# Patient Record
Sex: FEMALE | Race: WHITE | Employment: UNEMPLOYED | URBAN - METROPOLITAN AREA
[De-identification: names, ages, dates, MRNs, and addresses within clinical notes are randomized per-mention and may not be internally consistent; named-entity substitution may affect disease eponyms.]

---

## 2017-06-15 PROBLEM — Z78.0 MENOPAUSE: Status: ACTIVE | Noted: 2017-06-15

## 2018-08-20 PROBLEM — E66.01 SEVERE OBESITY (BMI 35.0-39.9): Status: ACTIVE | Noted: 2018-08-20

## 2018-09-11 ENCOUNTER — HOSPITAL ENCOUNTER (OUTPATIENT)
Dept: MAMMOGRAPHY | Age: 53
Discharge: HOME | End: 2018-09-11
Attending: OBSTETRICS & GYNECOLOGY
Payer: COMMERCIAL

## 2018-09-11 DIAGNOSIS — Z12.31 VISIT FOR SCREENING MAMMOGRAM: ICD-10-CM

## 2018-09-11 PROCEDURE — 77063 BREAST TOMOSYNTHESIS BI: CPT

## 2018-09-20 ENCOUNTER — HOSPITAL ENCOUNTER (OUTPATIENT)
Dept: MAMMOGRAPHY | Age: 53
Discharge: HOME | End: 2018-09-20
Attending: OBSTETRICS & GYNECOLOGY
Payer: COMMERCIAL

## 2018-09-20 DIAGNOSIS — R92.8 ABNORMAL SCREENING MAMMOGRAM: ICD-10-CM

## 2018-09-20 PROCEDURE — 77065 DX MAMMO INCL CAD UNI: CPT

## 2019-11-04 ENCOUNTER — HOSPITAL ENCOUNTER (OUTPATIENT)
Dept: MAMMOGRAPHY | Age: 54
Discharge: HOME | End: 2019-11-04
Attending: OBSTETRICS & GYNECOLOGY

## 2019-11-04 DIAGNOSIS — Z12.31 SCREENING MAMMOGRAM FOR HIGH-RISK PATIENT: ICD-10-CM

## 2021-08-09 ENCOUNTER — HOSPITAL ENCOUNTER (OUTPATIENT)
Dept: MAMMOGRAPHY | Age: 56
Discharge: HOME | End: 2021-08-09
Attending: OBSTETRICS & GYNECOLOGY
Payer: COMMERCIAL

## 2021-08-09 DIAGNOSIS — Z12.31 VISIT FOR SCREENING MAMMOGRAM: ICD-10-CM

## 2021-08-09 PROCEDURE — 77067 SCR MAMMO BI INCL CAD: CPT

## 2022-03-19 PROBLEM — Z78.0 MENOPAUSE: Status: ACTIVE | Noted: 2017-06-15

## 2023-02-09 ENCOUNTER — TRANSCRIBE ORDERS (OUTPATIENT)
Dept: SCHEDULING | Age: 58
End: 2023-02-09

## 2023-02-09 DIAGNOSIS — Z12.31 SCREENING MAMMOGRAM FOR HIGH-RISK PATIENT: Primary | ICD-10-CM

## 2023-02-21 ENCOUNTER — HOSPITAL ENCOUNTER (OUTPATIENT)
Dept: MAMMOGRAPHY | Age: 58
Discharge: HOME | End: 2023-02-24
Payer: COMMERCIAL

## 2023-02-21 DIAGNOSIS — Z12.31 SCREENING MAMMOGRAM FOR HIGH-RISK PATIENT: ICD-10-CM

## 2023-02-21 PROCEDURE — 77067 SCR MAMMO BI INCL CAD: CPT

## 2023-04-03 ENCOUNTER — OFFICE VISIT (OUTPATIENT)
Dept: ORTHOPEDIC SURGERY | Age: 58
End: 2023-04-03

## 2023-04-03 VITALS — BODY MASS INDEX: 39.56 KG/M2 | HEIGHT: 62 IN | WEIGHT: 215 LBS

## 2023-04-03 DIAGNOSIS — M79.672 BILATERAL FOOT PAIN: Primary | ICD-10-CM

## 2023-04-03 DIAGNOSIS — M19.171 POST-TRAUMATIC OSTEOARTHRITIS OF RIGHT ANKLE: ICD-10-CM

## 2023-04-03 DIAGNOSIS — M19.171 POST-TRAUMATIC OSTEOARTHRITIS OF RIGHT FOOT: ICD-10-CM

## 2023-04-03 DIAGNOSIS — M19.172 POST-TRAUMATIC OSTEOARTHRITIS OF LEFT FOOT: ICD-10-CM

## 2023-04-03 DIAGNOSIS — M20.5X9 ACQUIRED CLAW TOE, UNSPECIFIED LATERALITY: ICD-10-CM

## 2023-04-03 DIAGNOSIS — G57.62 MORTON'S NEUROMA OF LEFT FOOT: ICD-10-CM

## 2023-04-03 DIAGNOSIS — M79.671 BILATERAL FOOT PAIN: Primary | ICD-10-CM

## 2023-04-03 RX ORDER — METHYLPREDNISOLONE ACETATE 40 MG/ML
40 INJECTION, SUSPENSION INTRA-ARTICULAR; INTRALESIONAL; INTRAMUSCULAR; SOFT TISSUE ONCE
Status: COMPLETED | OUTPATIENT
Start: 2023-04-03 | End: 2023-04-03

## 2023-04-03 RX ADMIN — METHYLPREDNISOLONE ACETATE 40 MG: 40 INJECTION, SUSPENSION INTRA-ARTICULAR; INTRALESIONAL; INTRAMUSCULAR; SOFT TISSUE at 12:15

## 2023-04-03 NOTE — PROGRESS NOTES
claw toe  Left posttraumatic 1-5 tarsometatarsal arthritis, 2-3 claw toes, 3-4 neuroma    Plan:   The patient and I discussed the above assessment. We explored treatment options. Despite her posttraumatic ankle valgus arthritis and bilateral tarsometatarsal arthritis, her issues deal with her forefeet  Left 2-3 claw toes are reducible with no MTP pain; her pain appears to be 3-4 neuroma related  Right second claw toe with mild MTP pain and not reducible  She would like surgery on her right claw toe and possibly left 2-3 claw toes, 3-4 neuroma  Her left forefoot was injected today to see if that resolves her pain, if so, isolated neurectomy without claw toe resection is an option  We discussed right 2nd claw toe as an isolated procedure or combining with left foot surgery    Advanced medical imaging: No indication for CT scan  We discussed care and shoe protection  Orthotic/prosthetic: Instead of custom insoles, recommend OOFOS       Medication - OTC meds prn  Surgical discussion: She understands that due to my thumb conditions, the need to see a surgical partner for any surgery requiring the operating room. I outlined surgery with certain risks/complications and expected post-op course. My opined surgical recommendation and surgical discussion may not align exactly with my surgical partner's opinion; therefore, my partner's recommendation is what should be accepted and followed. Follow up: Surgical partner  Work status: Sitting    This note was created using Dragon voice recognition software which may result in errors of speech and spelling recognition and word/phrase syntax errors.

## 2023-04-03 NOTE — LETTER
You can purchase the following items online or from local stores: [employer] - shoes or sandals   [email or web address]

## 2023-04-05 ENCOUNTER — OFFICE VISIT (OUTPATIENT)
Dept: ORTHOPEDIC SURGERY | Age: 58
End: 2023-04-05

## 2023-04-05 DIAGNOSIS — G56.01 RIGHT CARPAL TUNNEL SYNDROME: ICD-10-CM

## 2023-04-05 DIAGNOSIS — M79.642 BILATERAL HAND PAIN: Primary | ICD-10-CM

## 2023-04-05 DIAGNOSIS — M18.0 ARTHRITIS OF CARPOMETACARPAL (CMC) JOINT OF BOTH THUMBS: ICD-10-CM

## 2023-04-05 DIAGNOSIS — M79.641 BILATERAL HAND PAIN: Primary | ICD-10-CM

## 2023-04-05 RX ORDER — MELOXICAM 15 MG/1
15 TABLET ORAL DAILY
Qty: 28 TABLET | Refills: 0 | Status: SHIPPED | OUTPATIENT
Start: 2023-04-05 | End: 2023-05-03

## 2023-04-06 NOTE — PROGRESS NOTES
Patient was fit for a CMC splint for patients bilateral hand/joint. I demonstrated that the thumb slides into the opening and Velcro on the dorsal side of the hand. The strap continues around the thumb and Velcro's again on the ventral side of hand or palm, and then continues through the thumb and first finger to Velcro again on the dorsal side of hand. Patient was fitted and instructed on an  Wrist Splint for patients right wrist. Patient is aware the hand slides in the brace with the thumb placed threw the thumb hole. I demonstrated the correct way to tighten the brace straps to allow for a comfortable fit. Patient understood the correct way to wear the brace. Patient read and signed documenting they understand and agree to [facility] current DME return policy.
Plan:  We discussed the diagnosis and different treatment options. We discussed observation, day/night splinting, cortisone injection(s) and surgical reconstruction with trapeziectomy and suspension arthroplasty and the risks and benefits of all were clearly outlined. We discussed the fact that the vast majority of thumb CMC arthritis causes persistent chronic pain and that surgical reconstruction is the endpoint for patients whose symptoms are not properly alleviated with conservative measures but the vast majority of patients end up having surgery. After discussing all options in detail the patient elects to proceed with a right carpal tunnel brace. She will wear this at night. We will give her bilateral CMC braces. We will give her Mobic and Voltaren. We did discuss injections versus surgical intervention. She like to hold off at this time. .     Patient voiced accordance and understanding of the information provided and the formulated plan. All questions were answered to the patient's satisfaction during the encounter.     4 This is a chronic illness with exacerbation, progression, or side effect of treatment  Treatment at this time: Prescription medication and Brace    FRANCES Mckay - CNP  Orthopaedic Surgery  04/05/23  3:02 PM

## 2023-04-14 ENCOUNTER — TELEPHONE (OUTPATIENT)
Dept: ORTHOPEDIC SURGERY | Age: 58
End: 2023-04-14

## 2023-04-19 PROBLEM — D36.10 NEUROMA: Status: ACTIVE | Noted: 2023-04-19

## 2023-04-30 DIAGNOSIS — M18.0 ARTHRITIS OF CARPOMETACARPAL (CMC) JOINT OF BOTH THUMBS: ICD-10-CM

## 2023-04-30 DIAGNOSIS — G56.01 RIGHT CARPAL TUNNEL SYNDROME: ICD-10-CM

## 2023-05-01 RX ORDER — MELOXICAM 15 MG/1
TABLET ORAL
Qty: 28 TABLET | Refills: 0 | OUTPATIENT
Start: 2023-05-01

## 2023-05-02 ENCOUNTER — OFFICE VISIT (OUTPATIENT)
Dept: ORTHOPEDIC SURGERY | Age: 58
End: 2023-05-02
Payer: COMMERCIAL

## 2023-05-02 VITALS — BODY MASS INDEX: 39.56 KG/M2 | HEIGHT: 62 IN | WEIGHT: 215 LBS

## 2023-05-02 DIAGNOSIS — M20.5X2 ACQUIRED CLAW TOE OF LEFT FOOT: Primary | ICD-10-CM

## 2023-05-02 PROCEDURE — 99213 OFFICE O/P EST LOW 20 MIN: CPT | Performed by: ORTHOPAEDIC SURGERY

## 2023-05-02 NOTE — PROGRESS NOTES
Name: Anne Avalos  YOB: 1965  Gender: female  MRN: [id number]    04/03/2023: Last visit with me  05/02/2023: Here to discuss surgical options    Physical Examination:  Patient appears to be alert and oriented with acceptable appearance.   No obvious distress or SOB  CV: appears to have acceptable vascular color and capillary refill  Neuro: appears to have mostly intact light touch sensation   Skin: Healed incision; thickening right ankle: Bilateral mid feet  MS: Standing: Right 2 claw toe: Left 2-3 claw toes: Gait full  Right = no ankle pain; no midfoot pain; second claw toe  Left = no ankle pain; no midfoot pain; 2-3 claw toes but no MTP pain: 3-4 IDN click but no pain     XR: Left: Right: Standing AP lateral mortise ankle plus AP oblique foot taken 04/03/2023 with:  Left = second metatarsal head arthritis; claw toe; posttraumatic 1-5 tarsometatarsal, naviculocuneiform arthritis; large os trigonum; medial talar dome lucency consistent with OCD  Right = naviculocuneiform, tarsometatarsal arthritis; claw toe; retained hardware medial malleolus and lateral malleolus; ankle valgus posttraumatic arthritis  XR Impression:  As above       Reviewed Test/Records/Documents:  10/21/2009: Right ankle ORIF  2013: Left ankle and tarsometatarsal injury  09/14/2020: Diagnoses: Left 1-5 tarsometatarsal, naviculocuneiform arthritis neuralgia: Right 2-3 tarsometatarsal arthritis, neuralgia, posttraumatic ankle valgus   04/11/2023: Dr. Patricia Gutiérrez office note reviewed outlining right second hammertoe, left midfoot arthritis, left 3-4 neuroma: Discussed A1c elevation and 3-4 neurectomy     04/03/2023: Left 3-4 neuroma injected with 1 cc Xylocaine, 40 mg Depo-Medrol      Assessment:    Right posttraumatic ankle valgus arthritis with retained hardware  Right naviculocuneiform, tarsometatarsal arthritis, 2nd claw toe  Left posttraumatic 1-5 tarsometatarsal arthritis, 2-3 claw toes, 3-4 neuroma    Discussion:  Because

## 2023-05-15 ENCOUNTER — TELEPHONE (OUTPATIENT)
Dept: ORTHOPEDIC SURGERY | Age: 58
End: 2023-05-15

## 2023-05-15 RX ORDER — MELOXICAM 15 MG/1
15 TABLET ORAL DAILY
Qty: 30 TABLET | Refills: 1 | Status: SHIPPED | OUTPATIENT
Start: 2023-05-15 | End: 2023-06-14

## 2023-05-15 NOTE — TELEPHONE ENCOUNTER
She states the Mobic is working very well for her and she is wondering if she can get a refill. Please send to the pharmacy on file.

## 2023-05-23 ENCOUNTER — OFFICE VISIT (OUTPATIENT)
Dept: ORTHOPEDIC SURGERY | Age: 58
End: 2023-05-23
Payer: COMMERCIAL

## 2023-05-23 DIAGNOSIS — M19.079 ARTHRITIS OF MIDFOOT: Primary | ICD-10-CM

## 2023-05-23 PROCEDURE — 99213 OFFICE O/P EST LOW 20 MIN: CPT | Performed by: ORTHOPAEDIC SURGERY

## 2023-05-23 NOTE — PROGRESS NOTES
Name: Joselin Avalos  YOB: 1965  Gender: female  MRN: [id number]    Summary: Right second hammertoe, left midfoot arthritis, left 3/4 Kinsey's neuroma. She states that she wants to consider surgery for midfoot fusion, third and fourth hammertoe corrections along with Kinsey's neuroma excision. She will come back at a later date to discuss her surgery if she desires. CC: left forefoot pain-right hammertoe    HPI: Carlos Whitaker is a 60 y.o. female who has diabetic mild neuropathy with a significant history of bilateral foot surgeries. She does have a right second hammertoe that bothers her but her main issue is the left foot. She states she sustained a fracture of this foot approximate 20 years ago and has had multiple surgeries on this foot to fix hammertoes and released tight tendons. The most recent issue she has had pain over forefoot with numbness into the third and fourth toes. She states that her third and fourth toes feel very numb and tingly and that when she wears tight shoes or walks it causes shooting pains into the toes. She states she has had this before into her second and third toes but that was relieved with a neuroma surgery. She has been seeing a nonoperative orthopedic surgeon for a while regarding all of her foot problems. Most recently she was seen by Dr. Shira Rajput and April third 2023. He had multiple diagnoses for her including midfoot arthritis along with claw toes and neuroma. He provided an injection of Xylocaine into the left 3/4 neuroma. The patient states the rest the day she did very well but as the nighttime came on the pain resumed. She states she had temporary relief from this Xylocaine injection. She was also given neuroma pads but these do not help. She was then referred to me for surgical management. She states she has a hard time wearing shoes, close toed shoes bother her more.   She develops numbness tingling and

## 2023-05-26 ENCOUNTER — TELEPHONE (OUTPATIENT)
Dept: ORTHOPEDIC SURGERY | Age: 58
End: 2023-05-26

## 2023-07-06 ENCOUNTER — OFFICE VISIT (OUTPATIENT)
Dept: ORTHOPEDIC SURGERY | Age: 58
End: 2023-07-06

## 2023-07-06 DIAGNOSIS — M25.531 RIGHT WRIST PAIN: Primary | ICD-10-CM

## 2023-07-06 RX ORDER — MELOXICAM 15 MG/1
15 TABLET ORAL DAILY
Qty: 28 TABLET | Refills: 0 | Status: SHIPPED | OUTPATIENT
Start: 2023-07-06 | End: 2023-08-03

## 2023-07-06 RX ORDER — METHYLPREDNISOLONE 4 MG/1
TABLET ORAL
Qty: 1 KIT | Refills: 0 | Status: SHIPPED | OUTPATIENT
Start: 2023-07-06

## 2023-07-06 NOTE — PROGRESS NOTES
Patient was fitted and instructed on an  Wrist Splint for patients right wrist. Patient is aware the hand slides in the brace with the thumb placed threw the thumb hole. I demonstrated the correct way to tighten the brace straps to allow for a comfortable fit. Patient understood the correct way to wear the brace. Patient read and signed documenting they understand and agree to [facility]'s current DME return policy.

## 2023-07-06 NOTE — PROGRESS NOTES
Orthopaedic Hand Clinic Note    Name: Mitch Avalos  YOB: 1965  Gender: female  MRN: [id number]      CC: Patient seen today for right wrist pain    HPI: Xiomara Wagner is a 60 y.o. female right hand dominant with a chief complaint of right wrist pain. She reports Friday through Monday of last week she was wearing 2 bracelets on her right wrist.  She denies any injury. She reports Monday evening her wrist was very sore. She said by Tuesday morning it was very swollen and tender to touch. She reports that it his slightly improved. She did do some yard work this morning without difficulty. She has tried Voltaren gel. She has also tried her [address] [place] [address] Dr brace. ROS/Meds/PSH/PMH/FH/SH: I personally reviewed the patients standard intake form. Pertinents are discussed in the HPI    Physical Examination:  General: Awake and alert. HEENT: Normocephalic, atraumatic  CV/Pulm: Breathing even and unlabored  Skin: No obvious rashes noted. Lymphatic: No obvious evidence of lymphedema or lymphadenopathy    Musculoskeletal Exam:  Examination on the right upper extremity demonstrates cap refill < 5 seconds in all fingers  Patient has swelling to the right wrist.  There is no open areas. Patient is tender to palpation on the ulnar side of the wrist.  She is nontender of the distal radius. She is nontender over the radiocarpal joint. She denies paresthesia. She has good capillary refill. Imaging / Electrodiagnostic Tests:     None    Assessment:   1. Right wrist pain        Plan:   We discussed the diagnosis and different treatment options. We discussed observation, therapy, antiinflammatory medications and other pertinent treatment modalities. After discussing in detail the patient elects to proceed with Medrol Dosepak, Mobic, wrist brace. I want her to ice the wrist 3 times daily. I will reassess her progress back in 1 week.   I told her she can cancel that appointment if she is doing

## 2023-07-25 ENCOUNTER — OFFICE VISIT (OUTPATIENT)
Dept: ORTHOPEDIC SURGERY | Age: 58
End: 2023-07-25
Payer: COMMERCIAL

## 2023-07-25 ENCOUNTER — TELEPHONE (OUTPATIENT)
Dept: ORTHOPEDIC SURGERY | Age: 58
End: 2023-07-25

## 2023-07-25 DIAGNOSIS — M19.079 ARTHRITIS OF MIDFOOT: Primary | ICD-10-CM

## 2023-07-25 PROCEDURE — 99213 OFFICE O/P EST LOW 20 MIN: CPT | Performed by: ORTHOPAEDIC SURGERY

## 2023-07-25 NOTE — PROGRESS NOTES
wounds. No pain with calf squeeze. TTP @third and fourth webspace with swelling noted. Positive Angus's click that reproduces shooting pains into the third and fourth toes. The third and fourth toes have some rigid deformities at the IP joints or hammertoes. Multiple prior incisions are noted throughout the fifth toe, dorsum of the midfoot and great toe but none of these are sensitive. No signs of infection. Palpable dorsal osteophytes noted in the top of the midfoot with tenderness palpation over the tarsometatarsal joints. No signs of instability. normal silverskoid exam: With the hindfoot in neutral and forefoot supinated there is good ankle dorsiflexion with the knee flexed and extended. Cavovarus foot posture when standing. Talar tilt exam : normal  Anterior drawer exam w/ ankle plantarflexed at 20 deg: normal    Neuro:  decreased SILT in entire foot. [place] Monofilament 5.07 exam normal however    Vascular: Bilateral DP and PT: dorsalis pedis 4/4    Imaging:     BILATERAL and X-Ray 3 vw Foot (AP/Lateral/Oblique) for foot pain   Findings: Regarding the left foot there is a second hammertoe noted with a widening of the 3/4 webspace. Significant midfoot arthritis is noted the first second third fourth TMT joints with suspected proximal chronic Lisfranc injury. No signs of acute forefoot fractures. No signs of significant forefoot injuries. Regarding the right side there is an ankle hardware that is in place but no signs of significant forefoot anomalies except for a second hammertoe. Mild midfoot arthritis noted   Impression: Bilateral midfoot arthritis   Signature: Cristina Olivo MD        Assessment:   Left 3/4 neuroma  Left third and fourth hammertoe  Left midfoot arthritis    Plan:   4 This is a chronic illness/condition with exacerbation and progression  Treatment at this time:     At this point we had a long discussion about operative and nonoperative management.   I specifically

## 2023-07-30 DIAGNOSIS — M25.531 RIGHT WRIST PAIN: ICD-10-CM

## 2023-07-31 ENCOUNTER — TELEPHONE (OUTPATIENT)
Dept: ORTHOPEDIC SURGERY | Age: 58
End: 2023-07-31

## 2023-07-31 RX ORDER — MELOXICAM 15 MG/1
TABLET ORAL
Qty: 28 TABLET | Refills: 0 | OUTPATIENT
Start: 2023-07-31

## 2023-07-31 NOTE — TELEPHONE ENCOUNTER
LEFT A VM FOR PAT TO CALL BACK Patient complaining of left flank pain since yesterday  She states she had kidney stones in the past  Will check renal u/s

## 2023-07-31 NOTE — TELEPHONE ENCOUNTER
----- Message from Sirisha Lemos MA sent at 7/25/2023  2:57 PM EDT -----  Pt would like for [facility] to call her to change sx date please f/u

## 2023-08-04 ENCOUNTER — TELEPHONE (OUTPATIENT)
Dept: ORTHOPEDIC SURGERY | Age: 58
End: 2023-08-04

## 2023-09-07 NOTE — PROGRESS NOTES
HPI  Aries Garcia is a 60 y.o. female seen for annual GYN exam.  She c/o vaginal dryness. Past Medical History, Past Surgical History, Family history, Social History, and Medications were all reviewed with the patient today and updated as necessary.      Current Outpatient Medications   Medication Sig    estradiol (ESTRACE VAGINAL) 0.1 MG/GM vaginal cream Place 1 g vaginally Twice a Week    Estradiol (VAGIFEM) 10 MCG TABS vaginal tablet Place 1 tablet vaginally Twice a Week    estradiol (VIVELLE) 0.1 MG/24HR Place 1 patch onto the skin Twice a Week APPLY 1 PATCH ON THE SKIN 2 TIMES A WEEK    diclofenac sodium (VOLTAREN) 1 % GEL Apply 2 g topically 3 times daily for 28 days    amitriptyline (ELAVIL) 10 MG tablet Take 2 tablets by mouth    aspirin 81 MG EC tablet Take 1 tablet by mouth    metFORMIN (GLUCOPHAGE) 500 MG tablet Take 1 tablet by mouth 4 times daily    metoprolol succinate (TOPROL XL) 100 MG extended release tablet Take 1 tablet by mouth    SUMAtriptan (IMITREX) 50 MG tablet Take 1 tablet by mouth    OZEMPIC, 1 MG/DOSE, 4 MG/3ML SOPN INJECT 1 MG UNDER THE SKIN EVERY 7 DAYS    zolpidem (AMBIEN) 5 MG tablet     metoprolol succinate (TOPROL XL) 50 MG extended release tablet Take 1 tablet by mouth daily    meloxicam (MOBIC) 15 MG tablet Take 1 tablet by mouth daily for 28 days    methylPREDNISolone (MEDROL DOSEPACK) 4 MG tablet Follow package instructions (Patient not taking: Reported on 9/11/2023)    meloxicam (MOBIC) 15 MG tablet Take 1 tablet by mouth daily    meloxicam (MOBIC) 15 MG tablet Take 1 tablet by mouth daily for 28 days    dicyclomine (BENTYL) 10 MG capsule Take 10 mg by mouth as needed (Patient not taking: Reported on 9/11/2023)    Dulaglutide (TRULICITY) 5.00 BG/9.1MM SOPN 1 INJECTION WEEKLY (Patient not taking: Reported on 9/11/2023)    olmesartan-hydroCHLOROthiazide (BENICAR HCT) 40-25 MG per tablet TAKE 1 TABLET BY MOUTH EVERY DAY (Patient not taking: Reported on 9/11/2023)

## 2023-09-08 ENCOUNTER — OFFICE VISIT (OUTPATIENT)
Dept: ORTHOPEDIC SURGERY | Age: 58
End: 2023-09-08

## 2023-09-08 DIAGNOSIS — M18.12 ARTHRITIS OF CARPOMETACARPAL (CMC) JOINT OF LEFT THUMB: Primary | ICD-10-CM

## 2023-09-08 RX ORDER — BETAMETHASONE SODIUM PHOSPHATE AND BETAMETHASONE ACETATE 3; 3 MG/ML; MG/ML
6 INJECTION, SUSPENSION INTRA-ARTICULAR; INTRALESIONAL; INTRAMUSCULAR; SOFT TISSUE ONCE
Status: COMPLETED | OUTPATIENT
Start: 2023-09-08 | End: 2023-09-08

## 2023-09-08 RX ADMIN — BETAMETHASONE SODIUM PHOSPHATE AND BETAMETHASONE ACETATE 6 MG: 3; 3 INJECTION, SUSPENSION INTRA-ARTICULAR; INTRALESIONAL; INTRAMUSCULAR; SOFT TISSUE at 12:29

## 2023-09-08 NOTE — PROGRESS NOTES
Patient was fit for a CMC splint for patients left hand/joint. I demonstrated that the thumb slides into the opening and Velcro on the dorsal side of the hand. The strap continues around the thumb and Velcro's again on the ventral side of hand or palm, and then continues through the thumb and first finger to Velcro again on the dorsal side of hand. Patient read and signed documenting they understand and agree to [facility]'s current DME return policy.
will give her a new Comfort Cool brace. I will see her back as needed. Procedure Note    The risk, benefits and alternatives of injection and no injection therapy were discussed. Risks including skin blanching, subcutaneous fat atrophy, and elevations in blood glucose levels were discussed. The patient consented for an injection. Time out performed. The patient has been identified by name and birthdate. The injection site was identified, marked and prepped with a alcohol swab. The left thumb CMC joint was injected with 0.5ml of 6mg/ml Celestone and 0.5ml of Lidocaine plain 1%. The injection site was then dressed with a bandaid. The patient tolerated the injection well. The patient was instructed to monitor their blood sugars if diabetic and call if any concerns. The patient was instructed to call the office if any adverse local effects occurred or any if any questions or concerns arise. Patient voiced accordance and understanding of the information provided and the formulated plan. All questions were answered to the patient's satisfaction during the encounter. 4 This is a chronic illness with exacerbation, progression, or side effect of treatment  Treatment at this time:Brace and Minor procedure: cortisone injection. I discussed the risk of skin blanching and hyperglycemia. I discussed the symptoms of hyperglycemia such as confusion, lethargy, polyuria and polydipsia. If any of these symptoms occur the patient is to present to an urgent care facility or primary care doctor for blood sugar evaluation.     FRANCES Parker - CNP  Orthopaedic Surgery  09/08/23  12:33 PM

## 2023-09-11 ENCOUNTER — OFFICE VISIT (OUTPATIENT)
Dept: OBGYN CLINIC | Age: 58
End: 2023-09-11
Payer: COMMERCIAL

## 2023-09-11 VITALS
WEIGHT: 198 LBS | DIASTOLIC BLOOD PRESSURE: 70 MMHG | BODY MASS INDEX: 36.44 KG/M2 | HEIGHT: 62 IN | SYSTOLIC BLOOD PRESSURE: 130 MMHG

## 2023-09-11 DIAGNOSIS — N95.1 MENOPAUSE SYNDROME: ICD-10-CM

## 2023-09-11 DIAGNOSIS — N95.2 VAGINAL ATROPHY: ICD-10-CM

## 2023-09-11 DIAGNOSIS — Z01.419 ENCOUNTER FOR WELL WOMAN EXAM WITH ROUTINE GYNECOLOGICAL EXAM: Primary | ICD-10-CM

## 2023-09-11 PROCEDURE — 99386 PREV VISIT NEW AGE 40-64: CPT | Performed by: OBSTETRICS & GYNECOLOGY

## 2023-09-11 RX ORDER — METOPROLOL SUCCINATE 50 MG/1
50 TABLET, EXTENDED RELEASE ORAL DAILY
COMMUNITY
Start: 2023-08-23

## 2023-09-11 RX ORDER — ESTRADIOL 10 UG/1
10 INSERT VAGINAL
Qty: 24 TABLET | Refills: 4 | Status: SHIPPED | OUTPATIENT
Start: 2023-09-11

## 2023-09-11 RX ORDER — SEMAGLUTIDE 1.34 MG/ML
INJECTION, SOLUTION SUBCUTANEOUS
COMMUNITY
Start: 2023-08-14

## 2023-09-11 RX ORDER — ESTRADIOL 0.1 MG/G
1 CREAM VAGINAL
Qty: 42.5 G | Refills: 5 | Status: SHIPPED | OUTPATIENT
Start: 2023-09-11

## 2023-09-11 RX ORDER — ESTRADIOL 0.1 MG/D
1 FILM, EXTENDED RELEASE TRANSDERMAL
Qty: 24 PATCH | Refills: 3 | Status: SHIPPED | OUTPATIENT
Start: 2023-09-11

## 2023-09-11 RX ORDER — ZOLPIDEM TARTRATE 5 MG/1
TABLET ORAL
COMMUNITY
Start: 2023-09-08

## 2023-10-16 ENCOUNTER — TELEPHONE (OUTPATIENT)
Dept: ORTHOPEDIC SURGERY | Age: 58
End: 2023-10-16

## 2023-10-16 DIAGNOSIS — M19.079 ARTHRITIS OF MIDFOOT: Primary | ICD-10-CM

## 2023-10-16 NOTE — TELEPHONE ENCOUNTER
She needs to speak to [facility] about possibly rescheduling her surgery. Please give her a call.

## 2023-10-19 ENCOUNTER — OFFICE VISIT (OUTPATIENT)
Dept: ORTHOPEDIC SURGERY | Age: 58
End: 2023-10-19

## 2023-10-19 DIAGNOSIS — M65.321 TRIGGER FINGER, RIGHT INDEX FINGER: ICD-10-CM

## 2023-10-19 DIAGNOSIS — M18.11 ARTHRITIS OF CARPOMETACARPAL (CMC) JOINT OF RIGHT THUMB: Primary | ICD-10-CM

## 2023-10-19 RX ORDER — METHYLPREDNISOLONE ACETATE 40 MG/ML
40 INJECTION, SUSPENSION INTRA-ARTICULAR; INTRALESIONAL; INTRAMUSCULAR; SOFT TISSUE ONCE
Status: COMPLETED | OUTPATIENT
Start: 2023-10-19 | End: 2023-10-19

## 2023-10-19 RX ADMIN — METHYLPREDNISOLONE ACETATE 40 MG: 40 INJECTION, SUSPENSION INTRA-ARTICULAR; INTRALESIONAL; INTRAMUSCULAR; SOFT TISSUE at 11:42

## 2023-10-19 NOTE — PROGRESS NOTES
Orthopaedic Hand Surgery Note    Name: David Avalos  YOB: 1965  Gender: female  MRN: [id number]    CC: Follow up for right thumb Carpometacarpal Joint osteoarthritis    HPI: Patient is a 60 y.o. female who is here regarding follow up for right thumb CMC osteoarthritis. Patient also notes she is having right index finger pain, swelling, difficulty making a fist.  She said this just started a few days ago. She is wearing her CMC braces. ROS/Meds/PSH/PMH/FH/SH: I personally reviewed the patients standard intake form. Pertinents are discussed in the HPI    Physical Examination:  Musculoskeletal:   Examination of the right upper extremity demonstrates normal sensation in median, ulnar and radial distribution, negative carpal tunnel compression and Phalen test, cap refill < 5 seconds in all fingers. Mild prominence and instability of the base of the first metacarpal. CMC grind is positive for pain and crepitus. Thumb MCP joint hyperextends 0 degrees. No pain at the radial styloid. positive tenderness of the right index A1 pulley with palpable clicking and  negative   locking. The extensor tendons all track well over the MCP joints. Imaging / Electrodiagnostic Tests:     none    Assessment:     ICD-10-CM    1. Arthritis of carpometacarpal (CMC) joint of right thumb  M18.11 methylPREDNISolone acetate (DEPO-MEDROL) injection 40 mg     diclofenac sodium (VOLTAREN) 1 % GEL      2. Trigger finger, right index finger  M65.321 methylPREDNISolone acetate (DEPO-MEDROL) injection 40 mg     diclofenac sodium (VOLTAREN) 1 % GEL          Plan:  We again discussed the diagnosis and different treatment options. We discussed observation, day/night splinting, cortisone injection(s) and surgical reconstruction with trapeziectomy and suspension arthroplasty and the risks and benefits of all were clearly outlined.   We discussed the fact that the vast majority of thumb CMC arthritis causes persistent

## 2023-10-20 ENCOUNTER — TELEPHONE (OUTPATIENT)
Dept: ORTHOPEDIC SURGERY | Age: 58
End: 2023-10-20

## 2023-10-20 ENCOUNTER — HOSPITAL ENCOUNTER (OUTPATIENT)
Dept: CT IMAGING | Age: 58
Discharge: HOME | End: 2023-10-20
Attending: ORTHOPAEDIC SURGERY
Payer: COMMERCIAL

## 2023-10-20 DIAGNOSIS — M19.079 ARTHRITIS OF MIDFOOT: ICD-10-CM

## 2023-10-20 PROCEDURE — 73700 CT LOWER EXTREMITY W/O DYE: CPT

## 2023-10-20 RX ORDER — MELOXICAM 15 MG/1
15 TABLET ORAL DAILY
Qty: 30 TABLET | Refills: 0 | Status: SHIPPED | OUTPATIENT
Start: 2023-10-20 | End: 2023-11-19

## 2023-10-20 NOTE — TELEPHONE ENCOUNTER
She is requesting a refill on Meloxicam 15mg to [facility] on [address] in [place].

## 2023-11-06 DIAGNOSIS — M79.671 BILATERAL FOOT PAIN: ICD-10-CM

## 2023-11-06 DIAGNOSIS — M79.672 BILATERAL FOOT PAIN: ICD-10-CM

## 2023-11-06 LAB
EST. AVERAGE GLUCOSE BLD GHB EST-MCNC: 137 MG/DL
HBA1C MFR BLD: 6.4 % (ref 4.8–5.6)

## 2023-11-09 ENCOUNTER — TELEPHONE (OUTPATIENT)
Dept: ORTHOPEDIC SURGERY | Age: 58
End: 2023-11-09

## 2023-11-10 ENCOUNTER — TELEPHONE (OUTPATIENT)
Dept: ORTHOPEDIC SURGERY | Age: 58
End: 2023-11-10

## 2023-11-14 ENCOUNTER — OFFICE VISIT (OUTPATIENT)
Dept: ORTHOPEDIC SURGERY | Age: 58
End: 2023-11-14
Payer: COMMERCIAL

## 2023-11-14 DIAGNOSIS — M19.079 ARTHRITIS OF MIDFOOT: Primary | ICD-10-CM

## 2023-11-14 DIAGNOSIS — M18.11 ARTHRITIS OF CARPOMETACARPAL (CMC) JOINT OF RIGHT THUMB: ICD-10-CM

## 2023-11-14 DIAGNOSIS — M65.321 TRIGGER FINGER, RIGHT INDEX FINGER: ICD-10-CM

## 2023-11-14 PROCEDURE — 99214 OFFICE O/P EST MOD 30 MIN: CPT | Performed by: ORTHOPAEDIC SURGERY

## 2023-11-14 RX ORDER — TRIAMCINOLONE ACETONIDE 1 MG/G
CREAM TOPICAL
COMMUNITY
Start: 2023-09-28

## 2023-11-14 RX ORDER — MINOCYCLINE HYDROCHLORIDE 50 MG/1
CAPSULE ORAL
COMMUNITY
Start: 2023-09-28

## 2023-11-14 RX ORDER — OMEPRAZOLE 40 MG/1
CAPSULE, DELAYED RELEASE ORAL
COMMUNITY
Start: 2023-09-19

## 2023-11-14 RX ORDER — DAPAGLIFLOZIN 10 MG/1
TABLET, FILM COATED ORAL
COMMUNITY
Start: 2023-10-12

## 2023-11-14 RX ORDER — ATORVASTATIN CALCIUM 20 MG/1
TABLET, FILM COATED ORAL
COMMUNITY
Start: 2023-09-12

## 2023-11-14 RX ORDER — OLMESARTAN MEDOXOMIL AND HYDROCHLOROTHIAZIDE 40/12.5 40; 12.5 MG/1; MG/1
1 TABLET ORAL DAILY
COMMUNITY
Start: 2023-10-28

## 2023-11-14 NOTE — H&P (VIEW-ONLY)
regarding the Treatment Plan. Through shared decision making the patient elected to proceed with surgery and consented to the procedure.       Past Medical History:   Diagnosis Date    Arthritis     left foot OA and right ankle    Chronic pain     arthritic    Diverticulitis     Fibroid (bleeding) (uterine) 6/2013    severely enlarged uterus per patient    GERD (gastroesophageal reflux disease)     well controlled with Prilosec as needed    Hypertension     well controlled by meds    IBS (irritable bowel syndrome)     Nausea & vomiting     on occassion    Obesity (BMI 30-39.9)     bmi 36.8         Current Outpatient Medications:     atorvastatin (LIPITOR) 20 MG tablet, TAKE 1 TABLET BY MOUTH EVERY DAY NIGHTLY, Disp: , Rfl:     FARXIGA 10 MG tablet, TAKE 1 TABLET ORALLY DAILY, Disp: , Rfl:     minocycline (MINOCIN;DYNACIN) 50 MG capsule, TAKE 1 CAPSULE (50 MG) DAILY INDICATIONS: ACNE, Disp: , Rfl:     omeprazole (PRILOSEC) 40 MG delayed release capsule, TAKE 1 CAPSULE BY MOUTH DAILY 30 MINUTES BEFORE BREAKFAST., Disp: , Rfl:     triamcinolone (KENALOG) 0.1 % cream, APPLY TO AFFECTED AREAS TWICE DAILY AS NEEDED FOR ITCHY RASH/BITES., Disp: , Rfl:     olmesartan-hydroCHLOROthiazide (BENICAR HCT) 40-12.5 MG per tablet, Take 1 tablet by mouth daily, Disp: , Rfl:     meloxicam (MOBIC) 15 MG tablet, Take 1 tablet by mouth daily, Disp: 30 tablet, Rfl: 0    diclofenac sodium (VOLTAREN) 1 % GEL, Apply 2 g topically 3 times daily for 28 days, Disp: 168 g, Rfl: 0    OZEMPIC, 1 MG/DOSE, 4 MG/3ML SOPN, INJECT 1 MG UNDER THE SKIN EVERY 7 DAYS, Disp: , Rfl:     zolpidem (AMBIEN) 5 MG tablet, , Disp: , Rfl:     metoprolol succinate (TOPROL XL) 50 MG extended release tablet, Take 1 tablet by mouth daily, Disp: , Rfl:     estradiol (ESTRACE VAGINAL) 0.1 MG/GM vaginal cream, Place 1 g vaginally Twice a Week, Disp: 42.5 g, Rfl: 5    Estradiol (VAGIFEM) 10 MCG TABS vaginal tablet, Place 1 tablet vaginally Twice a Week, Disp: 24 tablet,

## 2023-11-14 NOTE — PROGRESS NOTES
webspace. ROS/Meds/PSH/PMH/FH/SH: I personally reviewed the patients standard intake form. Below are the pertinents    Tobacco:  reports that she has never smoked. She has never used smokeless tobacco.  Diabetes: Diabetic - Insulin dependent  A1c on April 8, 2023-7.4  Other: none    Physical Examination:  left lower: TTP . +silt s/s/sp/dp/t. 5/5 strength to EHL/FHL/AT/PT/Andreas/Achilles. toes wwp w/ BCR <2s. No open wounds. No pain with calf squeeze. TTP @third and fourth webspace with swelling noted. Positive Angus's click that reproduces shooting pains into the third and fourth toes. The third and fourth toes have some rigid deformities at the IP joints or hammertoes. Multiple prior incisions are noted throughout the fifth toe, dorsum of the midfoot and great toe but none of these are sensitive. No signs of infection. EDL to the second toe is obviously tighter prior scar. Palpable dorsal osteophytes noted in the top of the midfoot with tenderness palpation over the tarsometatarsal joints. No signs of instability. normal silverskoid exam: With the hindfoot in neutral and forefoot supinated there is good ankle dorsiflexion with the knee flexed and extended. Cavovarus foot posture when standing. Talar tilt exam : normal  Anterior drawer exam w/ ankle plantarflexed at 20 deg: normal    Neuro:  decreased SILT in entire foot. [place] Monofilament 5.07 exam normal however    Vascular: Bilateral DP and PT: dorsalis pedis 4/4    Imaging:     BILATERAL and X-Ray 3 vw Foot (AP/Lateral/Oblique) for foot pain   Findings: Regarding the left foot there is a second hammertoe noted with a widening of the 3/4 webspace. Significant midfoot arthritis is noted the first second third fourth TMT joints with suspected proximal chronic Lisfranc injury. No signs of acute forefoot fractures. No signs of significant forefoot injuries.   Regarding the right side there is an ankle hardware that is in place but no signs of

## 2023-11-20 RX ORDER — MELOXICAM 15 MG/1
15 TABLET ORAL DAILY
Qty: 30 TABLET | Refills: 0 | OUTPATIENT
Start: 2023-11-20

## 2023-11-20 NOTE — PERIOP NOTE
Phone pre-assessment completed. Verified name&  . Order to obtain consent found in EHR & matches case posting. Type 1B surgery,  assessment complete. Orders  received. Labs per surgeon: none  Labs per anesthesia protocol: potassium on arrival    Pt sates h/o of severe hypotension requiring pressors in PACU 2023 at [facility] s/p Robotic hernia repair. Pt states that her PCP has dropped metoprolol from 100 mg to 50 mg currently. Pt can not remember if she took omlsartan-hct on DOS. Pt advised NOT to take olmesartan-hct on day of surgery, she verbalizes understanding to continue metoprolol 50 mg the night before as regularly scheduled. Chart sent to anesthesiologist for review. Patient answered medical/surgical history questions at their best of ability. All prior to admission medications documented in EPIC. Patient instructed to take ONLY the following medications the day of surgery according to anesthesia guidelines with a small sip of water: farxiga, omeprazole    If you have never been diagnosed with liver disease, take Acetaminophen 1000mg in the morning and then again before bed one day prior to surgery date. Prescription meds to hold: meloxicam, aspirin, and Ozempic- last dose 11/15/23- A clear liquid diet is a specific dietary plan that only includes liquids that are fully transparent at room temperature. Some items that are allowed include water, ice, fruit juices without pulp, sports drinks, carbonated drinks, gelatin, tea, coffee, clear broths, and clear ice pops. VERBALIZES UNDERSTANDING TO HOLD ALL VITAMINS AND SUPPLEMENTS and NSAIDS (aspirin, naproxen, ibuprofen) IMMEDIATELY PER ANESTHESIA PROTOCOL.     Instructed on the following:    > Arrive at [address] [facility] ([address])[facility] [address]  >Time of arrival to be called the day before by 1700  > NPO after midnight including gum, mints, and ice chips  > Responsible adult must drive patient to the hospital, stay during

## 2023-11-21 ENCOUNTER — ANESTHESIA EVENT (OUTPATIENT)
Dept: SURGERY | Age: 58
End: 2023-11-21
Payer: COMMERCIAL

## 2023-11-21 DIAGNOSIS — M19.079 ARTHRITIS OF MIDFOOT: Primary | ICD-10-CM

## 2023-11-21 RX ORDER — OXYCODONE HYDROCHLORIDE AND ACETAMINOPHEN 5; 325 MG/1; MG/1
1 TABLET ORAL EVERY 6 HOURS PRN
Qty: 30 TABLET | Refills: 0 | Status: SHIPPED | OUTPATIENT
Start: 2023-11-21 | End: 2023-11-26

## 2023-11-21 RX ORDER — ASPIRIN 81 MG/1
81 TABLET ORAL 2 TIMES DAILY
Qty: 60 TABLET | Refills: 0 | Status: SHIPPED | OUTPATIENT
Start: 2023-11-21

## 2023-11-21 RX ORDER — DOCUSATE SODIUM 100 MG/1
100 CAPSULE, LIQUID FILLED ORAL DAILY PRN
Qty: 30 CAPSULE | Refills: 0 | Status: SHIPPED | OUTPATIENT
Start: 2023-11-21

## 2023-11-21 RX ORDER — ONDANSETRON 4 MG/1
4 TABLET, FILM COATED ORAL DAILY PRN
Qty: 30 TABLET | Refills: 0 | Status: SHIPPED | OUTPATIENT
Start: 2023-11-21

## 2023-11-21 NOTE — PERIOP NOTE
Preop department called to notify patient of arrival time for scheduled procedure. Instructions given to   - Arrive at [address] [facility]. - Remain NPO after midnight, unless otherwise indicated, including gum, mints, and ice chips. - Have a responsible adult to drive patient to the hospital, stay during surgery, and patient will need supervision 24 hours after anesthesia. - Use antibacterial soap in shower the night before surgery and on the morning of surgery.        Was patient contacted: yes-pt  Voicemail left: n/a  Numbers contacted: [phone]   Arrival time: [phone]

## 2023-11-22 ENCOUNTER — HOSPITAL ENCOUNTER (OUTPATIENT)
Age: 58
Setting detail: OUTPATIENT SURGERY
Discharge: HOME | End: 2023-11-22
Attending: ORTHOPAEDIC SURGERY | Admitting: ORTHOPAEDIC SURGERY
Payer: COMMERCIAL

## 2023-11-22 ENCOUNTER — APPOINTMENT (OUTPATIENT)
Dept: GENERAL RADIOLOGY | Age: 58
End: 2023-11-22
Attending: ORTHOPAEDIC SURGERY
Payer: COMMERCIAL

## 2023-11-22 ENCOUNTER — ANESTHESIA (OUTPATIENT)
Dept: SURGERY | Age: 58
End: 2023-11-22
Payer: COMMERCIAL

## 2023-11-22 VITALS
SYSTOLIC BLOOD PRESSURE: 100 MMHG | DIASTOLIC BLOOD PRESSURE: 66 MMHG | HEART RATE: 93 BPM | HEIGHT: 62 IN | OXYGEN SATURATION: 98 % | BODY MASS INDEX: 35.7 KG/M2 | TEMPERATURE: 98.3 F | WEIGHT: 194 LBS | RESPIRATION RATE: 18 BRPM

## 2023-11-22 LAB
GLUCOSE BLD STRIP.AUTO-MCNC: 104 MG/DL (ref 65–100)
GLUCOSE BLD STRIP.AUTO-MCNC: 156 MG/DL (ref 65–100)
POTASSIUM BLD-SCNC: 4.2 MMOL/L (ref 3.5–5.1)
SERVICE CMNT-IMP: ABNORMAL
SERVICE CMNT-IMP: ABNORMAL

## 2023-11-22 PROCEDURE — 7100000011 HC PHASE II RECOVERY - ADDTL 15 MIN: Performed by: ORTHOPAEDIC SURGERY

## 2023-11-22 PROCEDURE — 88304 TISSUE EXAM BY PATHOLOGIST: CPT

## 2023-11-22 PROCEDURE — 88311 DECALCIFY TISSUE: CPT

## 2023-11-22 PROCEDURE — 84132 ASSAY OF SERUM POTASSIUM: CPT

## 2023-11-22 PROCEDURE — 2500000003 HC RX 250 WO HCPCS: Performed by: ANESTHESIOLOGY

## 2023-11-22 PROCEDURE — C1713 ANCHOR/SCREW BN/BN,TIS/BN: HCPCS | Performed by: ORTHOPAEDIC SURGERY

## 2023-11-22 PROCEDURE — 2500000003 HC RX 250 WO HCPCS: Performed by: NURSE ANESTHETIST, CERTIFIED REGISTERED

## 2023-11-22 PROCEDURE — 6370000000 HC RX 637 (ALT 250 FOR IP): Performed by: ANESTHESIOLOGY

## 2023-11-22 PROCEDURE — 7100000001 HC PACU RECOVERY - ADDTL 15 MIN: Performed by: ORTHOPAEDIC SURGERY

## 2023-11-22 PROCEDURE — 3700000000 HC ANESTHESIA ATTENDED CARE: Performed by: ORTHOPAEDIC SURGERY

## 2023-11-22 PROCEDURE — 7100000010 HC PHASE II RECOVERY - FIRST 15 MIN: Performed by: ORTHOPAEDIC SURGERY

## 2023-11-22 PROCEDURE — 7100000000 HC PACU RECOVERY - FIRST 15 MIN: Performed by: ORTHOPAEDIC SURGERY

## 2023-11-22 PROCEDURE — 3700000001 HC ADD 15 MINUTES (ANESTHESIA): Performed by: ORTHOPAEDIC SURGERY

## 2023-11-22 PROCEDURE — 6360000002 HC RX W HCPCS: Performed by: NURSE ANESTHETIST, CERTIFIED REGISTERED

## 2023-11-22 PROCEDURE — 88307 TISSUE EXAM BY PATHOLOGIST: CPT

## 2023-11-22 PROCEDURE — 3600000004 HC SURGERY LEVEL 4 BASE: Performed by: ORTHOPAEDIC SURGERY

## 2023-11-22 PROCEDURE — 82962 GLUCOSE BLOOD TEST: CPT

## 2023-11-22 PROCEDURE — 2709999900 HC NON-CHARGEABLE SUPPLY: Performed by: ORTHOPAEDIC SURGERY

## 2023-11-22 PROCEDURE — 64447 NJX AA&/STRD FEMORAL NRV IMG: CPT | Performed by: ANESTHESIOLOGY

## 2023-11-22 PROCEDURE — 64445 NJX AA&/STRD SCIATIC NRV IMG: CPT | Performed by: ANESTHESIOLOGY

## 2023-11-22 PROCEDURE — 2580000003 HC RX 258: Performed by: ANESTHESIOLOGY

## 2023-11-22 PROCEDURE — 6360000002 HC RX W HCPCS: Performed by: ANESTHESIOLOGY

## 2023-11-22 PROCEDURE — C1734 ORTH/DEVIC/DRUG BN/BN,TIS/BN: HCPCS | Performed by: ORTHOPAEDIC SURGERY

## 2023-11-22 PROCEDURE — 3600000014 HC SURGERY LEVEL 4 ADDTL 15MIN: Performed by: ORTHOPAEDIC SURGERY

## 2023-11-22 PROCEDURE — 6360000002 HC RX W HCPCS: Performed by: PHYSICIAN ASSISTANT

## 2023-11-22 DEVICE — K-WIRE: Type: IMPLANTABLE DEVICE | Site: FOOT | Status: FUNCTIONAL

## 2023-11-22 DEVICE — IMPLANTABLE DEVICE
Type: IMPLANTABLE DEVICE | Site: FOOT | Status: FUNCTIONAL
Brand: EASYFUSE™

## 2023-11-22 DEVICE — GRAFT BNE SUB 5CC VIABLE MTRX COMPRESSIBLE MOLD RDY TO USE: Type: IMPLANTABLE DEVICE | Site: FOOT | Status: FUNCTIONAL

## 2023-11-22 DEVICE — K WIRE FIX L229MM DIA1.6MM S STL SGL TRCR PNT STYL SMOOTH: Type: IMPLANTABLE DEVICE | Site: FOOT | Status: FUNCTIONAL

## 2023-11-22 DEVICE — GRAFT BNE INJ 3 CC AUG: Type: IMPLANTABLE DEVICE | Site: FOOT | Status: FUNCTIONAL

## 2023-11-22 RX ORDER — TRANEXAMIC ACID 100 MG/ML
INJECTION, SOLUTION INTRAVENOUS PRN
Status: DISCONTINUED | OUTPATIENT
Start: 2023-11-22 | End: 2023-11-22 | Stop reason: SDUPTHER

## 2023-11-22 RX ORDER — DEXAMETHASONE SODIUM PHOSPHATE 10 MG/ML
INJECTION, SOLUTION INTRAMUSCULAR; INTRAVENOUS
Status: COMPLETED | OUTPATIENT
Start: 2023-11-22 | End: 2023-11-22

## 2023-11-22 RX ORDER — MIDAZOLAM HYDROCHLORIDE 2 MG/2ML
2 INJECTION, SOLUTION INTRAMUSCULAR; INTRAVENOUS
Status: COMPLETED | OUTPATIENT
Start: 2023-11-22 | End: 2023-11-22

## 2023-11-22 RX ORDER — OXYCODONE HYDROCHLORIDE 5 MG/1
5 TABLET ORAL
Status: COMPLETED | OUTPATIENT
Start: 2023-11-22 | End: 2023-11-22

## 2023-11-22 RX ORDER — SODIUM CHLORIDE 9 MG/ML
INJECTION, SOLUTION INTRAVENOUS PRN
Status: DISCONTINUED | OUTPATIENT
Start: 2023-11-22 | End: 2023-11-22 | Stop reason: HOSPADM

## 2023-11-22 RX ORDER — LIDOCAINE HYDROCHLORIDE 10 MG/ML
1 INJECTION, SOLUTION INFILTRATION; PERINEURAL
Status: DISCONTINUED | OUTPATIENT
Start: 2023-11-22 | End: 2023-11-22 | Stop reason: HOSPADM

## 2023-11-22 RX ORDER — EPHEDRINE SULFATE/0.9% NACL/PF 50 MG/5 ML
SYRINGE (ML) INTRAVENOUS PRN
Status: DISCONTINUED | OUTPATIENT
Start: 2023-11-22 | End: 2023-11-22 | Stop reason: SDUPTHER

## 2023-11-22 RX ORDER — KETOROLAC TROMETHAMINE 30 MG/ML
INJECTION, SOLUTION INTRAMUSCULAR; INTRAVENOUS PRN
Status: DISCONTINUED | OUTPATIENT
Start: 2023-11-22 | End: 2023-11-22 | Stop reason: SDUPTHER

## 2023-11-22 RX ORDER — ACETAMINOPHEN 500 MG
1000 TABLET ORAL ONCE
Status: COMPLETED | OUTPATIENT
Start: 2023-11-22 | End: 2023-11-22

## 2023-11-22 RX ORDER — SODIUM CHLORIDE 0.9 % (FLUSH) 0.9 %
5-40 SYRINGE (ML) INJECTION PRN
Status: DISCONTINUED | OUTPATIENT
Start: 2023-11-22 | End: 2023-11-22 | Stop reason: HOSPADM

## 2023-11-22 RX ORDER — ROPIVACAINE HYDROCHLORIDE 5 MG/ML
INJECTION, SOLUTION EPIDURAL; INFILTRATION; PERINEURAL
Status: DISCONTINUED | OUTPATIENT
Start: 2023-11-22 | End: 2023-11-22

## 2023-11-22 RX ORDER — HALOPERIDOL 5 MG/ML
1 INJECTION INTRAMUSCULAR
Status: DISCONTINUED | OUTPATIENT
Start: 2023-11-22 | End: 2023-11-22 | Stop reason: HOSPADM

## 2023-11-22 RX ORDER — ROPIVACAINE HYDROCHLORIDE 5 MG/ML
INJECTION, SOLUTION EPIDURAL; INFILTRATION; PERINEURAL
Status: DISCONTINUED | OUTPATIENT
Start: 2023-11-22 | End: 2023-11-22 | Stop reason: SDUPTHER

## 2023-11-22 RX ORDER — LIDOCAINE HYDROCHLORIDE 20 MG/ML
INJECTION, SOLUTION EPIDURAL; INFILTRATION; INTRACAUDAL; PERINEURAL PRN
Status: DISCONTINUED | OUTPATIENT
Start: 2023-11-22 | End: 2023-11-22 | Stop reason: SDUPTHER

## 2023-11-22 RX ORDER — FENTANYL CITRATE 50 UG/ML
INJECTION, SOLUTION INTRAMUSCULAR; INTRAVENOUS PRN
Status: DISCONTINUED | OUTPATIENT
Start: 2023-11-22 | End: 2023-11-22 | Stop reason: SDUPTHER

## 2023-11-22 RX ORDER — FENTANYL CITRATE 50 UG/ML
100 INJECTION, SOLUTION INTRAMUSCULAR; INTRAVENOUS
Status: COMPLETED | OUTPATIENT
Start: 2023-11-22 | End: 2023-11-22

## 2023-11-22 RX ORDER — BUPIVACAINE HYDROCHLORIDE AND EPINEPHRINE 2.5; 5 MG/ML; UG/ML
INJECTION, SOLUTION EPIDURAL; INFILTRATION; INTRACAUDAL; PERINEURAL
Status: COMPLETED | OUTPATIENT
Start: 2023-11-22 | End: 2023-11-22

## 2023-11-22 RX ORDER — ROCURONIUM BROMIDE 10 MG/ML
INJECTION, SOLUTION INTRAVENOUS PRN
Status: DISCONTINUED | OUTPATIENT
Start: 2023-11-22 | End: 2023-11-22 | Stop reason: SDUPTHER

## 2023-11-22 RX ORDER — HYDROMORPHONE HYDROCHLORIDE 2 MG/ML
0.5 INJECTION, SOLUTION INTRAMUSCULAR; INTRAVENOUS; SUBCUTANEOUS EVERY 5 MIN PRN
Status: DISCONTINUED | OUTPATIENT
Start: 2023-11-22 | End: 2023-11-22 | Stop reason: HOSPADM

## 2023-11-22 RX ORDER — PROCHLORPERAZINE EDISYLATE 5 MG/ML
5 INJECTION INTRAMUSCULAR; INTRAVENOUS
Status: DISCONTINUED | OUTPATIENT
Start: 2023-11-22 | End: 2023-11-22 | Stop reason: HOSPADM

## 2023-11-22 RX ORDER — NEOSTIGMINE METHYLSULFATE 1 MG/ML
INJECTION, SOLUTION INTRAVENOUS PRN
Status: DISCONTINUED | OUTPATIENT
Start: 2023-11-22 | End: 2023-11-22 | Stop reason: SDUPTHER

## 2023-11-22 RX ORDER — SODIUM CHLORIDE 0.9 % (FLUSH) 0.9 %
5-40 SYRINGE (ML) INJECTION EVERY 12 HOURS SCHEDULED
Status: DISCONTINUED | OUTPATIENT
Start: 2023-11-22 | End: 2023-11-22 | Stop reason: HOSPADM

## 2023-11-22 RX ORDER — PHENYLEPHRINE HYDROCHLORIDE 10 MG/ML
INJECTION INTRAVENOUS PRN
Status: DISCONTINUED | OUTPATIENT
Start: 2023-11-22 | End: 2023-11-22 | Stop reason: SDUPTHER

## 2023-11-22 RX ORDER — ONDANSETRON 2 MG/ML
INJECTION INTRAMUSCULAR; INTRAVENOUS PRN
Status: DISCONTINUED | OUTPATIENT
Start: 2023-11-22 | End: 2023-11-22 | Stop reason: SDUPTHER

## 2023-11-22 RX ORDER — GLYCOPYRROLATE 0.2 MG/ML
INJECTION INTRAMUSCULAR; INTRAVENOUS PRN
Status: DISCONTINUED | OUTPATIENT
Start: 2023-11-22 | End: 2023-11-22 | Stop reason: SDUPTHER

## 2023-11-22 RX ORDER — SODIUM CHLORIDE, SODIUM LACTATE, POTASSIUM CHLORIDE, CALCIUM CHLORIDE 600; 310; 30; 20 MG/100ML; MG/100ML; MG/100ML; MG/100ML
INJECTION, SOLUTION INTRAVENOUS CONTINUOUS
Status: DISCONTINUED | OUTPATIENT
Start: 2023-11-22 | End: 2023-11-22 | Stop reason: HOSPADM

## 2023-11-22 RX ORDER — PROPOFOL 10 MG/ML
INJECTION, EMULSION INTRAVENOUS PRN
Status: DISCONTINUED | OUTPATIENT
Start: 2023-11-22 | End: 2023-11-22 | Stop reason: SDUPTHER

## 2023-11-22 RX ADMIN — PHENYLEPHRINE HYDROCHLORIDE 100 MCG: 10 INJECTION INTRAVENOUS at 11:20

## 2023-11-22 RX ADMIN — Medication 2 G: at 11:20

## 2023-11-22 RX ADMIN — PHENYLEPHRINE HYDROCHLORIDE 200 MCG: 10 INJECTION INTRAVENOUS at 12:11

## 2023-11-22 RX ADMIN — PROPOFOL 200 MG: 10 INJECTION, EMULSION INTRAVENOUS at 11:03

## 2023-11-22 RX ADMIN — MIDAZOLAM 2 MG: 1 INJECTION INTRAMUSCULAR; INTRAVENOUS at 10:12

## 2023-11-22 RX ADMIN — Medication 10 MG: at 12:23

## 2023-11-22 RX ADMIN — SODIUM CHLORIDE, SODIUM LACTATE, POTASSIUM CHLORIDE, AND CALCIUM CHLORIDE: 600; 310; 30; 20 INJECTION, SOLUTION INTRAVENOUS at 12:19

## 2023-11-22 RX ADMIN — FENTANYL CITRATE 100 MCG: 50 INJECTION, SOLUTION INTRAMUSCULAR; INTRAVENOUS at 10:12

## 2023-11-22 RX ADMIN — ONDANSETRON 4 MG: 2 INJECTION INTRAMUSCULAR; INTRAVENOUS at 12:44

## 2023-11-22 RX ADMIN — PHENYLEPHRINE HYDROCHLORIDE 200 MCG: 10 INJECTION INTRAVENOUS at 12:23

## 2023-11-22 RX ADMIN — LIDOCAINE HYDROCHLORIDE 80 MG: 20 INJECTION, SOLUTION EPIDURAL; INFILTRATION; INTRACAUDAL; PERINEURAL at 11:03

## 2023-11-22 RX ADMIN — TRANEXAMIC ACID 1000 MG: 100 INJECTION, SOLUTION INTRAVENOUS at 12:57

## 2023-11-22 RX ADMIN — SODIUM CHLORIDE, SODIUM LACTATE, POTASSIUM CHLORIDE, AND CALCIUM CHLORIDE: 600; 310; 30; 20 INJECTION, SOLUTION INTRAVENOUS at 09:31

## 2023-11-22 RX ADMIN — ROPIVACAINE HYDROCHLORIDE 15 ML: 5 INJECTION, SOLUTION EPIDURAL; INFILTRATION; PERINEURAL at 13:44

## 2023-11-22 RX ADMIN — ROCURONIUM BROMIDE 40 MG: 10 INJECTION, SOLUTION INTRAVENOUS at 11:03

## 2023-11-22 RX ADMIN — SODIUM CHLORIDE, SODIUM LACTATE, POTASSIUM CHLORIDE, AND CALCIUM CHLORIDE: 600; 310; 30; 20 INJECTION, SOLUTION INTRAVENOUS at 10:55

## 2023-11-22 RX ADMIN — FENTANYL CITRATE 25 MCG: 50 INJECTION, SOLUTION INTRAMUSCULAR; INTRAVENOUS at 12:02

## 2023-11-22 RX ADMIN — Medication 10 MG: at 12:18

## 2023-11-22 RX ADMIN — PHENYLEPHRINE HYDROCHLORIDE 100 MCG: 10 INJECTION INTRAVENOUS at 12:13

## 2023-11-22 RX ADMIN — GLYCOPYRROLATE 0.4 MG: 0.2 INJECTION INTRAMUSCULAR; INTRAVENOUS at 13:14

## 2023-11-22 RX ADMIN — PHENYLEPHRINE HYDROCHLORIDE 100 MCG: 10 INJECTION INTRAVENOUS at 12:57

## 2023-11-22 RX ADMIN — DEXAMETHASONE SODIUM PHOSPHATE 5 MG: 10 INJECTION, SOLUTION INTRAMUSCULAR; INTRAVENOUS at 10:12

## 2023-11-22 RX ADMIN — BUPIVACAINE HYDROCHLORIDE AND EPINEPHRINE 25 ML: 2.5; 5 INJECTION, SOLUTION EPIDURAL; INFILTRATION; INTRACAUDAL; PERINEURAL at 10:12

## 2023-11-22 RX ADMIN — PHENYLEPHRINE HYDROCHLORIDE 100 MCG: 10 INJECTION INTRAVENOUS at 12:14

## 2023-11-22 RX ADMIN — ROCURONIUM BROMIDE 10 MG: 10 INJECTION, SOLUTION INTRAVENOUS at 11:49

## 2023-11-22 RX ADMIN — OXYCODONE 5 MG: 5 TABLET ORAL at 14:06

## 2023-11-22 RX ADMIN — Medication 3 MG: at 13:14

## 2023-11-22 RX ADMIN — FENTANYL CITRATE 25 MCG: 50 INJECTION, SOLUTION INTRAMUSCULAR; INTRAVENOUS at 11:53

## 2023-11-22 RX ADMIN — FENTANYL CITRATE 50 MCG: 50 INJECTION, SOLUTION INTRAMUSCULAR; INTRAVENOUS at 11:29

## 2023-11-22 RX ADMIN — Medication 10 MG: at 12:51

## 2023-11-22 RX ADMIN — PHENYLEPHRINE HYDROCHLORIDE 100 MCG: 10 INJECTION INTRAVENOUS at 11:45

## 2023-11-22 RX ADMIN — BUPIVACAINE HYDROCHLORIDE AND EPINEPHRINE BITARTRATE 10 ML: 2.5; .005 INJECTION, SOLUTION EPIDURAL; INFILTRATION; INTRACAUDAL; PERINEURAL at 10:15

## 2023-11-22 RX ADMIN — PHENYLEPHRINE HYDROCHLORIDE 200 MCG: 10 INJECTION INTRAVENOUS at 12:51

## 2023-11-22 RX ADMIN — KETOROLAC TROMETHAMINE 15 MG: 30 INJECTION, SOLUTION INTRAMUSCULAR; INTRAVENOUS at 12:43

## 2023-11-22 RX ADMIN — ACETAMINOPHEN 1000 MG: 500 TABLET, FILM COATED ORAL at 09:31

## 2023-11-22 ASSESSMENT — PAIN - FUNCTIONAL ASSESSMENT: PAIN_FUNCTIONAL_ASSESSMENT: 0-10

## 2023-11-22 ASSESSMENT — PAIN SCALES - GENERAL
PAINLEVEL_OUTOF10: 0
PAINLEVEL_OUTOF10: 8

## 2023-11-22 ASSESSMENT — PAIN DESCRIPTION - DESCRIPTORS: DESCRIPTORS: ACHING

## 2023-11-22 NOTE — ANESTHESIA PROCEDURE NOTES
Peripheral Block    Patient location during procedure: pre-op  Reason for block: post-op pain management and at surgeon's request  Start time: 11/22/2023 1:44 PM  End time: 11/22/2023 1:50 PM  Staffing  Performed: anesthesiologist   Anesthesiologist: Ramiro Pepper MD  Performed by: Ramiro Pepper MD  Authorized by: Ramiro Pepper MD    Preanesthetic Checklist  Completed: patient identified, IV checked, site marked, risks and benefits discussed, surgical/procedural consents, equipment checked, pre-op evaluation, timeout performed, anesthesia consent given, oxygen available, monitors applied/VS acknowledged, fire risk safety assessment completed and verbalized and blood product R/B/A discussed and consented  Peripheral Block   Patient position: supine  Prep: ChloraPrep  Provider prep: mask and sterile gloves  Patient monitoring: responsive to questions, oxygen, IV access, frequent blood pressure checks, continuous pulse ox and cardiac monitor  Block type: Sciatic  Popliteal  Laterality: left  Injection technique: single-shot  Guidance: ultrasound guided    Needle   Needle type: insulated echogenic nerve stimulator needle   Needle gauge: 21 G  Needle localization: ultrasound guidance  Needle length: 10 cm  Assessment   Injection assessment: negative aspiration for heme, no paresthesia on injection, local visualized surrounding nerve on ultrasound, no intravascular symptoms and low pressure verified by pressure monitor  Paresthesia pain: none  Slow fractionated injection: yes  Hemodynamics: stable  Real-time US image taken/store: yes  Outcomes: uncomplicated and patient tolerated procedure well    Additional Notes  Pt had pain post op. Reblock preformed.    Medications Administered  ropivacaine (NAROPIN) injection 0.5% - Perineural   15 mL - 11/22/2023 1:44:00 PM

## 2023-11-22 NOTE — ANESTHESIA PROCEDURE NOTES
Peripheral Block    Patient location during procedure: pre-op  Reason for block: post-op pain management and at surgeon's request  Start time: 11/22/2023 10:15 AM  End time: 11/22/2023 10:17 AM  Staffing  Performed: anesthesiologist   Anesthesiologist: Kiley Olivera MD  Performed by: Kiley Olivera MD  Authorized by: Kiley Olivera MD    Preanesthetic Checklist  Completed: patient identified, IV checked, site marked, risks and benefits discussed, surgical/procedural consents, equipment checked, pre-op evaluation, timeout performed, anesthesia consent given, oxygen available, monitors applied/VS acknowledged, fire risk safety assessment completed and verbalized and blood product R/B/A discussed and consented  Peripheral Block   Patient position: supine  Prep: ChloraPrep  Provider prep: mask and sterile gloves  Patient monitoring: responsive to questions, oxygen, IV access, frequent blood pressure checks, continuous pulse ox and cardiac monitor  Block type: Femoral  Adductor canal  Laterality: left  Injection technique: single-shot  Guidance: ultrasound guided    Needle   Needle type: insulated echogenic nerve stimulator needle   Needle gauge: 21 G  Needle localization: ultrasound guidance  Needle length: 10 cm  Assessment   Injection assessment: negative aspiration for heme, no paresthesia on injection, local visualized surrounding nerve on ultrasound, no intravascular symptoms and low pressure verified by pressure monitor  Paresthesia pain: none  Slow fractionated injection: yes  Hemodynamics: stable  Real-time US image taken/store: yes  Outcomes: uncomplicated and patient tolerated procedure well    Medications Administered  bupivacaine 0.25%-EPINEPHrine injection 1:200000 - Perineural   10 mL - 11/22/2023 10:15:00 AM

## 2023-11-22 NOTE — INTERVAL H&P NOTE
Update History & Physical    The Patient's History and Physical was reviewed   I discussed the surgery and patients medical condition with the patient. The chart was reviewed with the patient and I examined the patient. There was no change. The surgical site was confirmed by the patient and me. CV: RRR  RESP: CTAB    Plan:  The risk, benefits, expected outcome, and alternative to the recommended procedure have been discussed with the patient. Patient understands and wants to proceed with the procedure.     Electronically signed by Colby Yee MD on 11/22/23 8:43 AM

## 2023-11-22 NOTE — DISCHARGE INSTRUCTIONS
weight on the strong leg. Move your crutches about 12 inches in front of you, and start the next step. When you're confident using the crutches, you can move the crutches and your injured leg at the same time. Then push straight down on the crutches as you step past the crutches with your strong leg, as you would in normal walking. Take small steps. Use ramps and elevators when you can. Sitting down    To sit, back up to the chair. Use one hand to hold both crutches by the handgrips, beside your injured leg. With the other hand, hold onto the seat and slowly lower yourself onto the chair. Lay the crutches on the ground near your chair. If you prop them up, they may fall over. Getting up from a chair    To get up from a chair,  the crutches and put them in one hand beside your injured leg. Put your weight on the handgrips of the crutches and on your strong leg to stand up. Walking up stairs    To go up stairs, step up with your strong leg and then bring the crutches and your injured leg to the upper step. For stairs that have handrails: Put both crutches under the arm opposite the handrail. Use the hand opposite the handrail to hold both crutches by the handgrips. Hold onto the handrail as you go up. Put your strong leg on the step first when you go up. Walking down stairs    To go down stairs, put your crutches and injured leg on the lower step. Bring your strong leg to the lower step. This saying may help you remember: \"Up with the good, down with the bad. \"  For stairs that have handrails: Put both crutches under the arm opposite the handrail. Use the hand opposite the handrail to hold both crutches by the handgrips. Hold onto the handrail as you go down. Follow the same process you use for stairs: Lead with your crutches and injured leg on the way down.

## 2023-11-22 NOTE — ANESTHESIA PROCEDURE NOTES
Peripheral Block    Patient location during procedure: pre-op  Reason for block: post-op pain management and at surgeon's request  Start time: 11/22/2023 10:12 AM  End time: 11/22/2023 10:15 AM  Staffing  Performed: anesthesiologist   Anesthesiologist: Jonny Miller MD  Performed by: Jonny Miller MD  Authorized by: Jonny Miller MD    Preanesthetic Checklist  Completed: patient identified, IV checked, site marked, risks and benefits discussed, surgical/procedural consents, equipment checked, pre-op evaluation, timeout performed, anesthesia consent given, oxygen available, monitors applied/VS acknowledged, fire risk safety assessment completed and verbalized and blood product R/B/A discussed and consented  Peripheral Block   Patient position: supine  Prep: ChloraPrep  Provider prep: mask and sterile gloves  Patient monitoring: IV access, oxygen, responsive to questions, cardiac monitor, continuous pulse ox and frequent blood pressure checks  Block type: Sciatic  Popliteal  Laterality: left  Injection technique: single-shot  Guidance: ultrasound guided    Needle   Needle type: insulated echogenic nerve stimulator needle   Needle gauge: 21 G  Needle localization: ultrasound guidance  Needle length: 10 cm  Assessment   Injection assessment: no intravascular symptoms, low pressure verified by pressure monitor, no paresthesia on injection, negative aspiration for heme and local visualized surrounding nerve on ultrasound  Paresthesia pain: none  Slow fractionated injection: yes  Hemodynamics: stable  Real-time US image taken/store: yes  Outcomes: uncomplicated and patient tolerated procedure well    Medications Administered  dexamethasone (DECADRON) (PF) 10 mg/mL injection - Other   5 mg - 11/22/2023 10:12:00 AM  bupivacaine 0.25%-EPINEPHrine injection [id number] - Perineural   25 mL - 11/22/2023 10:12:00 AM

## 2023-11-24 DIAGNOSIS — G89.18 POST-OP PAIN: Primary | ICD-10-CM

## 2023-11-24 RX ORDER — OXYCODONE HYDROCHLORIDE 5 MG/1
5 TABLET ORAL EVERY 4 HOURS PRN
Qty: 30 TABLET | Refills: 0 | OUTPATIENT
Start: 2023-11-24 | End: 2023-12-08

## 2023-11-24 RX ORDER — TRAMADOL HYDROCHLORIDE 50 MG/1
50 TABLET ORAL EVERY 4 HOURS PRN
Qty: 30 TABLET | Refills: 0 | Status: CANCELLED | OUTPATIENT
Start: 2023-11-24 | End: 2023-11-29

## 2023-11-24 RX ORDER — OXYCODONE HYDROCHLORIDE 5 MG/1
5 TABLET ORAL EVERY 4 HOURS PRN
Qty: 20 TABLET | Refills: 0 | Status: SHIPPED | OUTPATIENT
Start: 2023-11-24 | End: 2023-12-01

## 2023-12-04 ENCOUNTER — TELEPHONE (OUTPATIENT)
Dept: ORTHOPEDIC SURGERY | Age: 58
End: 2023-12-04

## 2023-12-04 NOTE — TELEPHONE ENCOUNTER
Patient would like to have a refill on percocet if possible. She really needs it mainly at night and she is going to take her last one before bed today.

## 2023-12-05 ENCOUNTER — TELEPHONE (OUTPATIENT)
Dept: ORTHOPEDIC SURGERY | Age: 58
End: 2023-12-05

## 2023-12-05 DIAGNOSIS — G89.18 POST-OP PAIN: Primary | ICD-10-CM

## 2023-12-05 RX ORDER — OXYCODONE HYDROCHLORIDE 5 MG/1
5 TABLET ORAL EVERY 6 HOURS PRN
Qty: 20 TABLET | Refills: 0 | Status: SHIPPED | OUTPATIENT
Start: 2023-12-05 | End: 2023-12-10

## 2023-12-12 ENCOUNTER — OFFICE VISIT (OUTPATIENT)
Dept: ORTHOPEDIC SURGERY | Age: 58
End: 2023-12-12

## 2023-12-12 DIAGNOSIS — G89.18 POST-OP PAIN: Primary | ICD-10-CM

## 2023-12-12 DIAGNOSIS — M19.079 ARTHRITIS OF MIDFOOT: ICD-10-CM

## 2023-12-12 PROCEDURE — 99024 POSTOP FOLLOW-UP VISIT: CPT | Performed by: ORTHOPAEDIC SURGERY

## 2023-12-12 NOTE — PROGRESS NOTES
Name: Melvina Avalos  YOB: 1965  Gender: female  MRN: [id number]    Plan:    DVT px: ASA 81 mg BID    Weight-bearing status: NWB    Short leg splint applied today        Return to work/work restrictions: none    Follow-up in 1 week for suture removal of the toes. We can leave the splint on at that time. She will then be seen back 2 weeks after that and we can take x-rays that point. Procedure: left 3/4 neuroma excison - Left and Left Midfoot Fusion - Left    Surgery Date: 11/22/2023      Subjective: Denies fevers chills or infection. Denies any signs of spreading erythema. Patient is doing well overall. She denies any significant pain. She is concerned today with the amount of swelling that she has after removal of the splint. She has a friend who has a foot ulcer and is worried about removing sutures too early. She wishes to have them kept in for a couple more days. ROS: Patient Denies fever/chills, headache, visual changes, chest pain, shortness of breath, and nausea/vomiting/diarrhea     Exam:     Wounds: appears to be healing well with good reapproximation, healing well ,  midfoot and heel incisions sutures were removed without difficulty. Longitudinal incision at the base of the third and fourth metatarsals were left due to swelling. K wires clean and left in place. Vascular: BLE: 2+ DP pulse, toes wwp w/ BCR<2s    Imaging:   Interpretation of imaging and   X-Ray LEFT Foot 3 vw (AP/Lateral/Oblique) for foot pain   Findings: No signs of fracture or dislocations. Stable effusions of the first second and third metatarsal tarsal joints. Stable fixation to the MTP joint with K wire fixation. No signs of loss of fixation. Impression: Midfoot fusion noted.      Signature: Beatriz Verma MD

## 2023-12-16 DIAGNOSIS — M19.079 ARTHRITIS OF MIDFOOT: Primary | ICD-10-CM

## 2023-12-16 RX ORDER — OXYCODONE HYDROCHLORIDE 5 MG/1
5 TABLET ORAL EVERY 8 HOURS PRN
Qty: 10 TABLET | Refills: 0 | Status: SHIPPED | OUTPATIENT
Start: 2023-12-16 | End: 2023-12-21

## 2023-12-28 ENCOUNTER — OFFICE VISIT (OUTPATIENT)
Dept: ORTHOPEDIC SURGERY | Age: 58
End: 2023-12-28

## 2023-12-28 DIAGNOSIS — G89.18 POST-OP PAIN: Primary | ICD-10-CM

## 2023-12-28 NOTE — PROGRESS NOTES
Her cast was bothering her. She came in today. Cast was removed. No signs of inflammation or wound breakdown. A new short leg cast was applied to her left leg    She will keep her previously scheduled appointment.   At that visit we will cut her cast off, get x-rays, and placed her into a boot

## 2024-01-04 ENCOUNTER — OFFICE VISIT (OUTPATIENT)
Dept: ORTHOPEDIC SURGERY | Age: 59
End: 2024-01-04
Payer: COMMERCIAL

## 2024-01-04 DIAGNOSIS — M18.12 ARTHRITIS OF CARPOMETACARPAL (CMC) JOINT OF LEFT THUMB: Primary | ICD-10-CM

## 2024-01-04 PROCEDURE — 20600 DRAIN/INJ JOINT/BURSA W/O US: CPT | Performed by: NURSE PRACTITIONER

## 2024-01-04 PROCEDURE — 99214 OFFICE O/P EST MOD 30 MIN: CPT | Performed by: NURSE PRACTITIONER

## 2024-01-04 RX ORDER — METHYLPREDNISOLONE ACETATE 40 MG/ML
40 INJECTION, SUSPENSION INTRA-ARTICULAR; INTRALESIONAL; INTRAMUSCULAR; SOFT TISSUE ONCE
Status: COMPLETED | OUTPATIENT
Start: 2024-01-04 | End: 2024-01-04

## 2024-01-04 RX ADMIN — METHYLPREDNISOLONE ACETATE 40 MG: 40 INJECTION, SUSPENSION INTRA-ARTICULAR; INTRALESIONAL; INTRAMUSCULAR; SOFT TISSUE at 13:31

## 2024-01-04 NOTE — PATIENT INSTRUCTIONS
Patient Education        Joint Injections: Care Instructions  Overview     Joint injections are shots into a joint, such as the knee. They may be used to put in medicines, such as pain relievers.  A corticosteroid, or steroid, shot is used to reduce inflammation in tendons or joints. It is often used to treat problems such as arthritis, tendinitis, and bursitis.  Steroids can be injected directly into a painful, inflamed joint. They can also help reduce inflammation of a bursa. A bursa is a sac of fluid. It cushions and lubricates areas where tendons, ligaments, skin, muscles, or bones rub against each other.  A steroid shot can sometimes help with short-term pain relief when other treatments haven't worked. If steroid shots help, pain may improve for weeks or months.  Follow-up care is a key part of your treatment and safety. Be sure to make and go to all appointments, and call your doctor if you are having problems. It's also a good idea to know your test results and keep a list of the medicines you take.  How can you care for yourself at home?  Put ice or a cold pack on the area for 10 to 20 minutes at a time. Put a thin cloth between the ice and your skin.  Ask your doctor if you can take an over-the-counter pain medicine, such as acetaminophen (Tylenol), ibuprofen (Advil, Motrin), or naproxen (Aleve). Be safe with medicines. Read and follow all instructions on the label.  Avoid strenuous activities for several days. In particular, avoid ones that put stress on the area where you got the shot.  If you have dressings over the area, keep them clean and dry. You may remove them when your doctor tells you to.  When should you call for help?   Call your doctor now or seek immediate medical care if:    You have signs of infection, such as:  Increased pain, swelling, warmth, or redness.  Red streaks leading from the site.  Pus draining from the site.  A fever.   Watch closely for changes in your health, and be sure to

## 2024-01-04 NOTE — PROGRESS NOTES
Orthopaedic Hand Surgery Note    Name: Shana Avalos  YOB: 1965  Gender: female  MRN: [id number]    CC: Follow up for left thumb Carpometacarpal Joint osteoarthritis    HPI: Patient is a 58 y.o. female who is here regarding follow up for left thumb CMC osteoarthritis.  I last saw her October 19.  She received a right thumb CMC injection and a right index trigger finger injection.  She reports good relief from those injections.  She is here for her left thumb CMC.  She is recovering from foot surgery by Dr. Valenzuela.  She is with her daughter today.    ROS/Meds/PSH/PMH/FH/SH: I personally reviewed the patients standard intake form.  Pertinents are discussed in the HPI    Physical Examination:  Musculoskeletal:   Examination of the left upper extremity demonstrates normal sensation in median, ulnar and radial distribution, negative carpal tunnel compression and Phalen test, cap refill < 5 seconds in all fingers.  Mild prominence and instability of the base of the first metacarpal. CMC grind is positive for pain and crepitus. Thumb MCP joint hyperextends 15 degrees. No pain at the radial styloid.    Imaging / Electrodiagnostic Tests:     None    Assessment:     ICD-10-CM    1. Arthritis of carpometacarpal (CMC) joint of left thumb  M18.12 methylPREDNISolone acetate (DEPO-MEDROL) injection 40 mg          Plan:  We again discussed the diagnosis and different treatment options. We discussed observation, day/night splinting, cortisone injection(s) and surgical reconstruction with trapeziectomy and suspension arthroplasty and the risks and benefits of all were clearly outlined.  We discussed the fact that the vast majority of thumb CMC arthritis causes persistent chronic pain and that surgical reconstruction is the endpoint for patients whose symptoms are not properly alleviated with conservative measures but the vast majority of patients end up having surgery. After discussing all options in detail

## 2024-01-11 ENCOUNTER — OFFICE VISIT (OUTPATIENT)
Dept: ORTHOPEDIC SURGERY | Age: 59
End: 2024-01-11

## 2024-01-11 DIAGNOSIS — G89.18 POST-OP PAIN: Primary | ICD-10-CM

## 2024-01-11 NOTE — PROGRESS NOTES
The patient was prescribed a walker boot for the patient's left foot. The patient wears a size 7 shoe and I fitted them with a S size boot. The patient was fitted and instructed on the use of prescribed walker boot. I explained how to fit themselves and that the plastic flexible piece should always be on the front of the boot and secured by the Velcro straps on top. The air bladder in the boot was adjusted according to proper fit and comfort. The patient walked a short distance and acknowledged satisfaction with current fit. I also explained that they need a heel lift or a higher heeled shoe for the uninvolved LE to help normalize gait and avoid excessive low back stress/strain due to leg length inequality created from walker boot.    Patient read and signed documenting they understand and agree to [facility]'s current DME return policy.

## 2024-01-11 NOTE — PROGRESS NOTES
Name: Shana Avalos  YOB: 1965  Gender: female  MRN: [id number]    Plan:    DVT px: ASA 81 mg BID    Weight-bearing status: heel WB for transfers only  Placed in cam boot today     Return to work/work restrictions: none    Follow-up in 6 weeks with foot x-rays.  Increase WB and begin PT         Procedure: left 3/4 neuroma excison - Left and Left Midfoot Fusion - Left    Surgery Date: 11/22/2023      Subjective: Denies fevers chills or infection.  Denies any signs of spreading erythema.  Patient is doing well overall.  She denies any significant pain.  She is concerned today with the amount of swelling that she has after removal of the splint.  She has a friend who has a foot ulcer and is worried about removing sutures too early.  She wishes to have them kept in for a couple more days.    12/21/23- patient returns for suture removal of toes. States she is still having a significant amount pain but notes improved swelling. She has been compliant with NWB.    1/11/2024-patient reports that she is doing well.  She is having some issues with her last cast that was placed.  She notes that her pain and her swelling have improved.    ROS: Patient Denies fever/chills, headache, visual changes, chest pain, shortness of breath, and nausea/vomiting/diarrhea     Exam:     Wounds: appears to be healing well with good reapproximation, healing well ,  all sutures were removed without difficulty.  Pins pulled today without difficulty.    Vascular: BLE: 2+ DP pulse, toes wwp w/ BCR<2s    Imaging:   Interpretation of imaging previously  X-Ray LEFT Foot 3 vw (AP/Lateral/Oblique) for foot pain   Findings: No signs of fracture or dislocations.  Stable effusions of the first second and third metatarsal tarsal joints.  Stable fixation to the MTP joint with K wire fixation.  No signs of loss of fixation.   Impression: Midfoot fusion noted.     Signature: Antolin James MD

## 2024-01-26 ENCOUNTER — TELEPHONE (OUTPATIENT)
Dept: ORTHOPEDIC SURGERY | Age: 59
End: 2024-01-26

## 2024-01-26 NOTE — TELEPHONE ENCOUNTER
Pt called and wants to be worked in next week for recheck. She has a lot of numbness in her foot and doesn't want to wait until her scheduled appt.

## 2024-02-27 ENCOUNTER — OFFICE VISIT (OUTPATIENT)
Dept: ORTHOPEDIC SURGERY | Age: 59
End: 2024-02-27
Payer: COMMERCIAL

## 2024-02-27 DIAGNOSIS — G89.18 POST-OP PAIN: ICD-10-CM

## 2024-02-27 DIAGNOSIS — M19.079 ARTHRITIS OF MIDFOOT: Primary | ICD-10-CM

## 2024-02-27 PROCEDURE — 99213 OFFICE O/P EST LOW 20 MIN: CPT | Performed by: ORTHOPAEDIC SURGERY

## 2024-02-27 PROCEDURE — M5018 MISC GEL HEEL CUP: HCPCS | Performed by: ORTHOPAEDIC SURGERY

## 2024-02-27 NOTE — PROGRESS NOTES
The patient was prescribed and fitted with 2 gel heel cups in the walker boot.     Patient read and signed documenting they understand and agree to [facility]'s current DME return policy.

## 2024-02-27 NOTE — PROGRESS NOTES
Name: Shana Avalos  YOB: 1965  Gender: female  MRN: [id number]    Plan:    Stable midfoot fusion.    Continue physical therapy.  Slowly transition out of cam walker boot into normal shoes    Return to work/work restrictions: none    Follow-up in 3 months with standing x-rays of the foot -at that point if doing well we can get customized inserts and have her follow-up in as-needed basis.         Procedure: left 3/4 neuroma excison - Left and Left Midfoot Fusion - Left    Surgery Date: 11/22/2023      Subjective: Denies fevers chills or infection.  Denies any signs of spreading erythema.  Patient is doing well overall.  She denies any significant pain.  She is concerned today with the amount of swelling that she has after removal of the splint.  She has a friend who has a foot ulcer and is worried about removing sutures too early.  She wishes to have them kept in for a couple more days.    12/21/23- patient returns for suture removal of toes. States she is still having a significant amount pain but notes improved swelling. She has been compliant with NWB.    1/11/2024-patient reports that she is doing well.  She is having some issues with her last cast that was placed.  She notes that her pain and her swelling have improved.    2/27/24-she is doing much better with her pain as she is started moving.  She is not walking or standing yet.  She is issues with the balance to move.  She is planning to go to the beach in several months.  She also describe some pain in the fourth metatarsal head.    ROS: Patient Denies fever/chills, headache, visual changes, chest pain, shortness of breath, and nausea/vomiting/diarrhea     Exam:     Wounds: appears to be healing well with good reapproximation, healing well ,   No signs of infection.  Pending sale to [facility].  Midfoot swelling edema noted.  Achilles tendon is intact.  Heel wound is intact.  No signs of infection.  Third and fourth toe still deviated into a

## 2024-03-04 ENCOUNTER — HOSPITAL ENCOUNTER (OUTPATIENT)
Dept: PHYSICAL THERAPY | Age: 59
Setting detail: RECURRING SERIES
Discharge: HOME | End: 2024-03-07
Attending: ORTHOPAEDIC SURGERY
Payer: COMMERCIAL

## 2024-03-04 DIAGNOSIS — R26.2 DIFFICULTY IN WALKING, NOT ELSEWHERE CLASSIFIED: ICD-10-CM

## 2024-03-04 DIAGNOSIS — M79.672 PAIN IN LEFT FOOT: Primary | ICD-10-CM

## 2024-03-04 PROCEDURE — 97162 PT EVAL MOD COMPLEX 30 MIN: CPT

## 2024-03-04 PROCEDURE — 97140 MANUAL THERAPY 1/> REGIONS: CPT

## 2024-03-04 PROCEDURE — 97110 THERAPEUTIC EXERCISES: CPT

## 2024-03-04 NOTE — THERAPY EVALUATION
Shana Avalos  : 1965  Primary: [employer] [employer] [place] ([place] [employer])  Secondary: GENERIC COMMERCIAL [facility] @ [place] [address] [place] [address]  Phone: [phone]  Fax: [phone] Plan Frequency: 4x/wk for 4 weeks    Plan of Care/Certification Expiration Date: 24        Plan of Care/Certification Expiration Date:  Plan of Care/Certification Expiration Date: 24    Frequency/Duration: Plan Frequency: 4x/wk for 4 weeks      Time In/Out:   Time In: 905  Time Out: 944      PT Visit Info:         Visit Count:  1                OUTPATIENT PHYSICAL THERAPY:             Initial Assessment 3/4/2024               Episode (L foot surgery)         Treatment Diagnosis:     Pain in left foot  Difficulty in walking, not elsewhere classified  Medical/Referring Diagnosis:    Post-op pain    Referring Physician:  Antolin James MD MD Orders:  PT Eval and Treat   Return MD Appt:  24  Date of Onset:  Onset Date: 23     Allergies:  Patient has no known allergies.  Restrictions/Precautions:    Post Surgical Precautions: WBAT; must remain in boot until 3/27/24; then can transition into tennis shoe      Medications Last Reviewed:  3/4/2024     SUBJECTIVE   History of Injury/Illness (Reason for Referral):  Shana reports s/p L Midfoot fusion multiple joints-first second and third TMT, Calcaneal bone graft harvest, Extensor tendon lengthening second MTP joint with angular soft tissue correction, Third MTP joint with angular soft tissue correction, Fourth MTP joint with angular soft tissue correction, and 3/4 Kinsey's neuroma excision performed 23. She was NWB for three months and is now WBAT in a boot and still using the scooter for long distances. She reports she feels that one incision is not healing properly. She reports stiffness and weakness in the L ankle. Her goals include to walk and wear shoes. She has spinal stenosis that does affect

## 2024-03-05 ENCOUNTER — HOSPITAL ENCOUNTER (OUTPATIENT)
Dept: PHYSICAL THERAPY | Age: 59
Setting detail: RECURRING SERIES
Discharge: HOME | End: 2024-03-08
Attending: ORTHOPAEDIC SURGERY
Payer: COMMERCIAL

## 2024-03-05 PROCEDURE — 97140 MANUAL THERAPY 1/> REGIONS: CPT

## 2024-03-05 PROCEDURE — 97110 THERAPEUTIC EXERCISES: CPT

## 2024-03-05 ASSESSMENT — PAIN SCALES - GENERAL
PAINLEVEL_OUTOF10: 3
PAINLEVEL_OUTOF10: 3

## 2024-03-05 NOTE — PROGRESS NOTES
Shana Avalos  : 1965  Primary: Jody B[employer] [place] ([place] BCBS)  Secondary: GENERIC COMMERCIAL [facility] @ [place] [address] [place] [address]  Phone: [phone]  Fax: [phone] Plan Frequency: 4x/wk for 4 weeks    Plan of Care/Certification Expiration Date: 24        Plan of Care/Certification Expiration Date:  Plan of Care/Certification Expiration Date: 24    Frequency/Duration:   Plan Frequency: 4x/wk for 4 weeks      Time In/Out:   Time In: 905  Time Out: 944      PT Visit Info:         Visit Count:  1    OUTPATIENT PHYSICAL THERAPY:   Treatment Note 3/4/2024       Episode  (L foot surgery)               Treatment Diagnosis:    Pain in left foot  Difficulty in walking, not elsewhere classified  Medical/Referring Diagnosis:    Post-op pain    Referring Physician:  Antolin James MD MD Orders:  PT Eval and Treat   Return MD Appt:  May 2024   Date of Onset:  Onset Date: 23     Allergies:   Patient has no known allergies.  Restrictions/Precautions:   Post Surgical Precautions:   WBAT; must remain in boot until 3/27/24; then can transition into tennis shoe       Interventions Planned (Treatment may consist of any combination of the following):     See Assessment Note    Subjective Comments:   See eval  Initial Pain Level::     310  Post Session Pain Level:       2/10  Medications Last Reviewed:  3/4/2024  Updated Objective Findings:  See Evaluation Note from today  Treatment   THERAPEUTIC EXERCISE: (10 minutes):    Exercises per grid below to improve mobility, strength, and balance.  Required moderate visual, verbal, manual, and tactile cues to promote proper body alignment, promote proper body posture, promote proper body mechanics, promote proper body breathing techniques, and proper technique .  Progressed resistance, range, repetitions, and complexity of movement as indicated.     Date:  3/4/24   Activity/Exercise Parameters

## 2024-03-05 NOTE — PROGRESS NOTES
Shana Avalos  : 1965  Primary: Jody Smith[employer] [place] ([place] BCBS)  Secondary: GENERIC COMMERCIAL [facility] @ [place] [address] [place] [address]  Phone: [phone]  Fax: [phone] Plan Frequency: 4x/wk for 4 weeks    Plan of Care/Certification Expiration Date: 24        Plan of Care/Certification Expiration Date:  Plan of Care/Certification Expiration Date: 24    Frequency/Duration:   Plan Frequency: 4x/wk for 4 weeks      Time In/Out:   Time In: 0900  Time Out: 0944      PT Visit Info:         Visit Count:  2    OUTPATIENT PHYSICAL THERAPY:   Treatment Note 3/5/2024       Episode  (L foot surgery)               Treatment Diagnosis:    Pain in left foot  Difficulty in walking, not elsewhere classified  Medical/Referring Diagnosis:    Post-op pain    Referring Physician:  Antolin James MD MD Orders:  PT Eval and Treat   Return MD Appt:  May 2024   Date of Onset:  Onset Date: 23     Allergies:   Patient has no known allergies.  Restrictions/Precautions:   Post Surgical Precautions:   WBAT; must remain in boot until 3/27/24; then can transition into tennis shoe       Interventions Planned (Treatment may consist of any combination of the following):     See Assessment Note    Subjective Comments:   Shana reports the bottom of her foot was hurting last night after walking around barefoot for short periods of time. She used ice which helped..  Initial Pain Level::     310  Post Session Pain Level:       1/10  Medications Last Reviewed:  3/5/2024  Updated Objective Findings:   0 deg active L DF ROM post treatment  Treatment   THERAPEUTIC EXERCISE: (19 minutes):    Exercises per grid below to improve mobility, strength, and balance.  Required moderate visual, verbal, manual, and tactile cues to promote proper body alignment, promote proper body posture, promote proper body mechanics, promote proper body breathing techniques, and proper

## 2024-03-06 ENCOUNTER — HOSPITAL ENCOUNTER (OUTPATIENT)
Dept: PHYSICAL THERAPY | Age: 59
Setting detail: RECURRING SERIES
Discharge: HOME | End: 2024-03-09
Attending: ORTHOPAEDIC SURGERY
Payer: COMMERCIAL

## 2024-03-06 PROCEDURE — 97110 THERAPEUTIC EXERCISES: CPT

## 2024-03-06 PROCEDURE — 97140 MANUAL THERAPY 1/> REGIONS: CPT

## 2024-03-06 ASSESSMENT — PAIN SCALES - GENERAL: PAINLEVEL_OUTOF10: 3

## 2024-03-06 NOTE — PROGRESS NOTES
Shana Avalos  : 1965  Primary: Jody Smithbs [place] ([place] BCBS)  Secondary: GENERIC COMMERCIAL [facility] [place] @ [place] [address] [place] [address]  Phone: [phone]  Fax: [phone] Plan Frequency: 4x/wk for 4 weeks    Plan of Care/Certification Expiration Date: 24        Plan of Care/Certification Expiration Date:  Plan of Care/Certification Expiration Date: 24    Frequency/Duration:   Plan Frequency: 4x/wk for 4 weeks      Time In/Out:   Time In: 1130  Time Out: 1225      PT Visit Info:         Visit Count:  3    OUTPATIENT PHYSICAL THERAPY:   Treatment Note 3/6/2024       Episode  (L foot surgery)               Treatment Diagnosis:    Pain in left foot  Difficulty in walking, not elsewhere classified  Medical/Referring Diagnosis:    Post-op pain    Referring Physician:  Antolin James MD MD Orders:  PT Eval and Treat   Return MD Appt:  May 2024   Date of Onset:  Onset Date: 23     Allergies:   Patient has no known allergies.  Restrictions/Precautions:   Post Surgical Precautions:   WBAT; must remain in boot until 3/27/24; then can transition into tennis shoe       Interventions Planned (Treatment may consist of any combination of the following):     See Assessment Note    Subjective Comments:   Shana reported she was doing pretty well, sore in the foot and swelling in the toes.     Initial Pain Level::     3/10  Post Session Pain Level:       2/10  Medications Last Reviewed:  3/6/2024  Updated Objective Findings:   0 deg active L DF ROM post treatment  Treatment   THERAPEUTIC EXERCISE: (25 minutes):    Exercises per grid below to improve mobility, strength, and balance.  Required moderate visual, verbal, manual, and tactile cues to promote proper body alignment, promote proper body posture, promote proper body mechanics, promote proper body breathing techniques, and proper technique .  Progressed resistance, range, repetitions, and

## 2024-03-07 ENCOUNTER — HOSPITAL ENCOUNTER (OUTPATIENT)
Dept: PHYSICAL THERAPY | Age: 59
Setting detail: RECURRING SERIES
Discharge: HOME | End: 2024-03-10
Attending: ORTHOPAEDIC SURGERY
Payer: COMMERCIAL

## 2024-03-07 PROCEDURE — 97110 THERAPEUTIC EXERCISES: CPT

## 2024-03-07 PROCEDURE — 97140 MANUAL THERAPY 1/> REGIONS: CPT

## 2024-03-07 NOTE — PROGRESS NOTES
PT [facility]   3/26/2024 10:30 AM Terrie Shields, PT [facility]   3/27/2024 12:30 PM Yolanda Monroy, PT [facility]   3/28/2024 11:30 AM Yolanda Monroy, PT [facility]   3/29/2024  9:45 AM Terrie Shields, PT [facility]   5/28/2024 11:00 AM Antolin James MD PO[facility]

## 2024-03-10 ASSESSMENT — PAIN SCALES - GENERAL: PAINLEVEL_OUTOF10: 2

## 2024-03-12 ENCOUNTER — HOSPITAL ENCOUNTER (OUTPATIENT)
Dept: PHYSICAL THERAPY | Age: 59
Setting detail: RECURRING SERIES
Discharge: HOME | End: 2024-03-15
Attending: ORTHOPAEDIC SURGERY
Payer: COMMERCIAL

## 2024-03-12 PROCEDURE — 97140 MANUAL THERAPY 1/> REGIONS: CPT

## 2024-03-12 PROCEDURE — 97110 THERAPEUTIC EXERCISES: CPT

## 2024-03-12 ASSESSMENT — PAIN SCALES - GENERAL: PAINLEVEL_OUTOF10: 2

## 2024-03-12 NOTE — PROGRESS NOTES
Shana Avalos  : 1965  Primary: Jody Hughes [employer] ([employer]ody [employer])  Secondary: GENERIC COMMERCIAL [facility] [place] @ [place] [address] [place] [address]  Phone: [phone]  Fax: [phone] Plan Frequency: 4x/wk for 4 weeks    Plan of Care/Certification Expiration Date: 24        Plan of Care/Certification Expiration Date:  Plan of Care/Certification Expiration Date: 24    Frequency/Duration:   Plan Frequency: 4x/wk for 4 weeks      Time In/Out:   Time In: 1536  Time Out: 1630      PT Visit Info:         Visit Count:  5    OUTPATIENT PHYSICAL THERAPY:   Treatment Note 3/12/2024       Episode  (L foot surgery)               Treatment Diagnosis:    Pain in left foot  Difficulty in walking, not elsewhere classified  Medical/Referring Diagnosis:    Post-op pain    Referring Physician:  Antolin James MD MD Orders:  PT Eval and Treat   Return MD Appt:  May 2024   Date of Onset:  Onset Date: 23     Allergies:   Patient has no known allergies.  Restrictions/Precautions:   Post Surgical Precautions:   WBAT; must remain in boot until 3/27/24; then can transition into tennis shoe       Interventions Planned (Treatment may consist of any combination of the following):     See Assessment Note    Subjective Comments:   Shana presents with compression sleeve on today, states it has been helping a lot. She has an ingrown toe nail that seems to be bothering her most. No mid foot pain.    Initial Pain Level::     2/10  Post Session Pain Level:       2/10  Medications Last Reviewed:  3/12/2024  Updated Objective Findings:   Gross limitation in dorsiflexion - improved post tx   Treatment   THERAPEUTIC EXERCISE: (43 minutes):    Exercises per grid below to improve mobility, strength, and balance.  Required moderate visual, verbal, manual, and tactile cues to promote proper body alignment, promote proper body posture, promote proper body mechanics, promote proper

## 2024-03-13 ENCOUNTER — HOSPITAL ENCOUNTER (OUTPATIENT)
Dept: PHYSICAL THERAPY | Age: 59
Setting detail: RECURRING SERIES
Discharge: HOME | End: 2024-03-16
Attending: ORTHOPAEDIC SURGERY
Payer: COMMERCIAL

## 2024-03-13 PROCEDURE — 97110 THERAPEUTIC EXERCISES: CPT

## 2024-03-13 ASSESSMENT — PAIN SCALES - GENERAL: PAINLEVEL_OUTOF10: 1

## 2024-03-13 NOTE — PROGRESS NOTES
[place]   3/15/2024  2:30 PM White, Terrie S, PT [facility]   3/19/2024  9:30 AM Eliana, Yolanda, PT [facility]   3/20/2024 12:30 PM Eliana, Yolanda, PT [facility]   3/26/2024 10:30 AM White, Terrie S, PT [facility]   3/29/2024  9:45 AM White, Terrie S, PT [facility]   4/2/2024 10:30 AM Eliana, Yolanda, PT [facility]   4/5/2024 10:30 AM White, Terrie S, PT [facility]   4/9/2024  9:30 AM Eliana, Yolanda, PT [facility]   4/12/2024 10:30 AM White, Terrie S, PT [facility]   4/16/2024 10:30 AM White, Terrie S, PT [facility]   4/19/2024 10:30 AM White, Terrie S, PT [facility]   4/23/2024 10:30 AM Eliana, Yolanda, PT [facility]   4/26/2024 10:30 AM White, Terrie S, PT [facility]   4/30/2024 10:30 AM White, Terrie S, PT [facility]   5/3/2024 10:30 AM White, Terrie S, PT [facility]   5/28/2024 11:00 AM Antolin James MD PO[facility] AM[facility]

## 2024-03-14 ENCOUNTER — APPOINTMENT (OUTPATIENT)
Dept: PHYSICAL THERAPY | Age: 59
End: 2024-03-14
Attending: ORTHOPAEDIC SURGERY
Payer: COMMERCIAL

## 2024-03-15 ENCOUNTER — HOSPITAL ENCOUNTER (OUTPATIENT)
Dept: PHYSICAL THERAPY | Age: 59
Setting detail: RECURRING SERIES
Discharge: HOME | End: 2024-03-18
Attending: ORTHOPAEDIC SURGERY
Payer: COMMERCIAL

## 2024-03-15 PROCEDURE — 97110 THERAPEUTIC EXERCISES: CPT

## 2024-03-15 PROCEDURE — 97140 MANUAL THERAPY 1/> REGIONS: CPT

## 2024-03-15 ASSESSMENT — PAIN SCALES - GENERAL: PAINLEVEL_OUTOF10: 1

## 2024-03-15 NOTE — PROGRESS NOTES
Shana Avalos  : 1965  Primary: Jody Simthbs [place] ([place] BCBS)  Secondary: GENERIC COMMERCIAL [facility] [place] @ [place] [address] [place] [place] [address]  Phone: [phone]  Fax: [phone] Plan Frequency: 4x/wk for 4 weeks    Plan of Care/Certification Expiration Date: 24        Plan of Care/Certification Expiration Date:  Plan of Care/Certification Expiration Date: 24    Frequency/Duration:   Plan Frequency: 4x/wk for 4 weeks      Time In/Out:   Time In: 1430  Time Out: 1515      PT Visit Info:         Visit Count:  7    OUTPATIENT PHYSICAL THERAPY:   Treatment Note 3/15/2024       Episode  (L foot surgery)               Treatment Diagnosis:    Pain in left foot  Difficulty in walking, not elsewhere classified  Medical/Referring Diagnosis:    Post-op pain  Post-op pain [G89.18]   Referring Physician:  Antolin James MD MD Orders:  PT Eval and Treat   Return MD Appt:  May 2024   Date of Onset:  Onset Date: 23     Allergies:   Patient has no known allergies.  Restrictions/Precautions:   Post Surgical Precautions:   WBAT; must remain in boot until 3/27/24; then can transition into tennis shoe       Interventions Planned (Treatment may consist of any combination of the following):     See Assessment Note    Subjective Comments:   Shana reported she overdid it yesterday working in the yard but she was wearing her boot. Today she feels pretty good.    Initial Pain Level::     10  Post Session Pain Level:       /10  Medications Last Reviewed:  3/15/2024  Updated Objective Findings: none today    Treatment   THERAPEUTIC EXERCISE: (35 minutes):    Exercises per grid below to improve mobility, strength, and balance.  Required moderate visual, verbal, manual, and tactile cues to promote proper body alignment, promote proper body posture, promote proper body mechanics, promote proper body breathing techniques, and proper technique .  Progressed

## 2024-03-19 ENCOUNTER — HOSPITAL ENCOUNTER (OUTPATIENT)
Dept: PHYSICAL THERAPY | Age: 59
Setting detail: RECURRING SERIES
Discharge: HOME | End: 2024-03-22
Attending: ORTHOPAEDIC SURGERY
Payer: COMMERCIAL

## 2024-03-19 PROCEDURE — 97140 MANUAL THERAPY 1/> REGIONS: CPT

## 2024-03-19 PROCEDURE — 97110 THERAPEUTIC EXERCISES: CPT

## 2024-03-19 ASSESSMENT — PAIN SCALES - GENERAL: PAINLEVEL_OUTOF10: 2

## 2024-03-19 NOTE — PROGRESS NOTES
Shana Avalos  : 1965  Primary: Jody Smithbs [employer] [place] ([place] BCBS)  Secondary: GENERIC COMMERCIAL [facility] @ [place] [address] [place] [address]  Phone: [phone]  Fax: [phone] Plan Frequency: 2x per week for 8 weeks    Plan of Care/Certification Expiration Date: 24        Plan of Care/Certification Expiration Date:  Plan of Care/Certification Expiration Date: 24    Frequency/Duration:   Plan Frequency: 2x per week for 8 weeks      Time In/Out:   Time In: 930  Time Out: 1035      PT Visit Info:         Visit Count:  8    OUTPATIENT PHYSICAL THERAPY:   Treatment Note 3/19/2024       Episode  (L foot surgery)               Treatment Diagnosis:    Pain in left foot  Difficulty in walking, not elsewhere classified  Medical/Referring Diagnosis:    Post-op pain  Post-op pain [G89.18]   Referring Physician:  Antolin James MD MD Orders:  PT Eval and Treat   Return MD Appt:  May 2024   Date of Onset:  Onset Date: 23     Allergies:   Patient has no known allergies.  Restrictions/Precautions:   Post Surgical Precautions:   WBAT; must remain in boot until 3/27/24; then can transition into tennis shoe       Interventions Planned (Treatment may consist of any combination of the following):     See Assessment Note    Subjective Comments:   Shana states she is doing ok, she did a lot of walking yesterday and had some increased swelling, resolved today. See PN.     Initial Pain Level::     2/10  Post Session Pain Level:       2/10  Medications Last Reviewed:  3/19/2024  Updated Objective Findings: DF -5 deg     Treatment   THERAPEUTIC EXERCISE: (50 minutes):    Exercises per grid below to improve mobility, strength, and balance.  Required moderate visual, verbal, manual, and tactile cues to promote proper body alignment, promote proper body posture, promote proper body mechanics, promote proper body breathing techniques, and proper technique .

## 2024-03-19 NOTE — THERAPY RECERTIFICATION
represents no disability.  Minimal detectable change is 12.3 points.    Medical Necessity:   > Patient is expected to demonstrate progress in strength, range of motion, balance, and functional technique to increase independence with standing, walking and household activities.  Reason For Services/Other Comments:  > Patient continues to require skilled intervention due to above listed impairments which limit function.      Regarding Shana Avalos's therapy, I certify that the treatment plan above will be carried out by a therapist or under their direction.  Thank you for this referral,  TILA DEVLIN PT     Referring Physician Signature: Antolin James MD                    Charge Capture  Appt Desk

## 2024-03-20 ENCOUNTER — HOSPITAL ENCOUNTER (OUTPATIENT)
Dept: PHYSICAL THERAPY | Age: 59
Setting detail: RECURRING SERIES
Discharge: HOME | End: 2024-03-23
Attending: ORTHOPAEDIC SURGERY
Payer: COMMERCIAL

## 2024-03-20 ENCOUNTER — TELEPHONE (OUTPATIENT)
Dept: ORTHOPEDIC SURGERY | Age: 59
End: 2024-03-20

## 2024-03-20 DIAGNOSIS — G56.01 RIGHT CARPAL TUNNEL SYNDROME: ICD-10-CM

## 2024-03-20 DIAGNOSIS — M18.0 ARTHRITIS OF CARPOMETACARPAL (CMC) JOINT OF BOTH THUMBS: ICD-10-CM

## 2024-03-20 PROCEDURE — 97140 MANUAL THERAPY 1/> REGIONS: CPT

## 2024-03-20 PROCEDURE — 97110 THERAPEUTIC EXERCISES: CPT

## 2024-03-20 RX ORDER — MELOXICAM 15 MG/1
15 TABLET ORAL DAILY
Qty: 90 TABLET | Refills: 0 | Status: SHIPPED | OUTPATIENT
Start: 2024-03-20 | End: 2024-06-18

## 2024-03-20 ASSESSMENT — PAIN SCALES - GENERAL: PAINLEVEL_OUTOF10: 4

## 2024-03-20 NOTE — PROGRESS NOTES
Shana Avalos  : 1965  Primary: Jody Hughes [employer] [place] ([place] BCBS)  Secondary: GENERIC COMMERCIAL [facility] @ [place] [address] [place] [address]  Phone: [phone]  Fax: [phone] Plan Frequency: 2x per week for 8 weeks    Plan of Care/Certification Expiration Date: 24        Plan of Care/Certification Expiration Date:  Plan of Care/Certification Expiration Date: 24    Frequency/Duration:   Plan Frequency: 2x per week for 8 weeks      Time In/Out:   Time In: 1233  Time Out: 1330      PT Visit Info:         Visit Count:  9    OUTPATIENT PHYSICAL THERAPY:   Treatment Note 3/20/2024       Episode  (L foot surgery)               Treatment Diagnosis:    Pain in left foot  Difficulty in walking, not elsewhere classified  Medical/Referring Diagnosis:    Post-op pain  Post-op pain [G89.18]   Referring Physician:  Antolin James MD MD Orders:  PT Eval and Treat   Return MD Appt:  May 2024   Date of Onset:  Onset Date: 23     Allergies:   Patient has no known allergies.  Restrictions/Precautions:   Post Surgical Precautions:   WBAT; must remain in boot until 3/27/24; then can transition into tennis shoe       Interventions Planned (Treatment may consist of any combination of the following):     See Assessment Note    Subjective Comments:   Shana reported things are not too great today, she was at home resting with her leg propped up when she had sharp pains in the dorsum of the L foot, it was in the boot. It resolved after 15 minutes. Then she twisted to pick something up and re-aggravated her TLJ region. Today, everything feels like it hurts.     Initial Pain Level::     4/10  Post Session Pain Level:       3/10  Medications Last Reviewed:  3/20/2024  Updated Objective Findings: DF -10  deg   Increased swelling  Pt arrives without her boot to the session in a regular shoe     Treatment   THERAPEUTIC EXERCISE: (40 minutes):    Exercises per grid

## 2024-03-26 ENCOUNTER — HOSPITAL ENCOUNTER (OUTPATIENT)
Dept: PHYSICAL THERAPY | Age: 59
Setting detail: RECURRING SERIES
Discharge: HOME | End: 2024-03-29
Attending: ORTHOPAEDIC SURGERY
Payer: COMMERCIAL

## 2024-03-26 PROCEDURE — 97110 THERAPEUTIC EXERCISES: CPT

## 2024-03-26 PROCEDURE — 97140 MANUAL THERAPY 1/> REGIONS: CPT

## 2024-03-26 ASSESSMENT — PAIN SCALES - GENERAL: PAINLEVEL_OUTOF10: 3

## 2024-03-26 NOTE — PROGRESS NOTES
Shana Avalos  : 1965  Primary: Jody Hughes [employer] [place] ([place] BCBS)  Secondary: GENERIC COMMERCIAL [facility] @ [place] [address] [place] [address]  Phone: [phone]  Fax: [phone] Plan Frequency: 2x per week for 8 weeks    Plan of Care/Certification Expiration Date: 24        Plan of Care/Certification Expiration Date:  Plan of Care/Certification Expiration Date: 24    Frequency/Duration:   Plan Frequency: 2x per week for 8 weeks      Time In/Out:   Time In: 1030  Time Out: 1115      PT Visit Info:         Visit Count:  10    OUTPATIENT PHYSICAL THERAPY:   Treatment Note 3/26/2024       Episode  (L foot surgery)               Treatment Diagnosis:    Pain in left foot  Difficulty in walking, not elsewhere classified  Medical/Referring Diagnosis:    Post-op pain  Post-op pain [G89.18]   Referring Physician:  Antolin James MD MD Orders:  PT Eval and Treat   Return MD Appt:  May 2024   Date of Onset:  Onset Date: 23     Allergies:   Patient has no known allergies.  Restrictions/Precautions:   Post Surgical Precautions:   WBAT; must remain in boot until 3/27/24; then can transition into tennis shoe       Interventions Planned (Treatment may consist of any combination of the following):     See Assessment Note    Subjective Comments:   Shana reports she has been wearing regular shoes. She reports no more back pain and continued intermittent L foot pain.    Initial Pain Level::     3/10  Post Session Pain Level:       3/10  Medications Last Reviewed:  3/26/2024  Updated Objective Findings: none today      Treatment   THERAPEUTIC EXERCISE: (25 minutes):    Exercises per grid below to improve mobility, strength, and balance.  Required moderate visual, verbal, manual, and tactile cues to promote proper body alignment, promote proper body posture, promote proper body mechanics, promote proper body breathing techniques, and proper technique .

## 2024-03-27 ENCOUNTER — APPOINTMENT (OUTPATIENT)
Dept: PHYSICAL THERAPY | Age: 59
End: 2024-03-27
Attending: ORTHOPAEDIC SURGERY
Payer: COMMERCIAL

## 2024-03-28 ENCOUNTER — APPOINTMENT (OUTPATIENT)
Dept: PHYSICAL THERAPY | Age: 59
End: 2024-03-28
Attending: ORTHOPAEDIC SURGERY
Payer: COMMERCIAL

## 2024-03-29 ENCOUNTER — HOSPITAL ENCOUNTER (OUTPATIENT)
Dept: PHYSICAL THERAPY | Age: 59
Setting detail: RECURRING SERIES
End: 2024-03-29
Attending: ORTHOPAEDIC SURGERY
Payer: COMMERCIAL

## 2024-03-29 NOTE — PROGRESS NOTES
Shana Avalos   (:1965) [facility] Center at  [place] [address]  [phone]  [phone]             DATE: 3/29/2024    Patient canceled appointment today due to sore throat (same day).  Will plan to follow up on next scheduled visit.    Terrie Shields, PT, DPT

## 2024-04-02 ENCOUNTER — HOSPITAL ENCOUNTER (OUTPATIENT)
Dept: PHYSICAL THERAPY | Age: 59
Setting detail: RECURRING SERIES
Discharge: HOME | End: 2024-04-05
Attending: ORTHOPAEDIC SURGERY
Payer: COMMERCIAL

## 2024-04-02 PROCEDURE — 97110 THERAPEUTIC EXERCISES: CPT

## 2024-04-02 PROCEDURE — 97140 MANUAL THERAPY 1/> REGIONS: CPT

## 2024-04-02 ASSESSMENT — PAIN SCALES - GENERAL: PAINLEVEL_OUTOF10: 3

## 2024-04-02 NOTE — PROGRESS NOTES
promote proper body posture, promote proper body mechanics, promote proper body breathing techniques, and proper technique .  Progressed resistance, range, repetitions, and complexity of movement as indicated.    Date: 04/02/24    LTR x2 min   Supine sciatic nerve glides x20 reps on L   Bridges x12 reps x5\" holds   Seated DF overpressure stretch with towel roll x2 min   Seated PF 8# DB x15 reps   BAPS board AP, laterally, and CW/CCW circles x12 B - level 1, cues, struggles   Sled: squats 2x10 reps lvl 5; SL Calf raise 3 cords x20 reps   Gait in office and stepping drill with DF emphasis and eccentric control       Deferred         MANUAL THERAPY: (10 minutes):   Joint mobilization and Soft tissue mobilization was utilized and necessary because of the patient's restricted joint motion, painful spasm, loss of articular motion, and restricted motion of soft tissue.   -Posterior talocrural glides grade 4  -talocrural distraction grade 4  -subtalar figure 4 and eversion mjm grade 3/4     Deferred    scar tissue stm/mfr at all scars with desensitization training (not today)      Treatment/Session Summary:    Treatment Assessment:   Shana did very well today, she struggles with ankle stability and control. Plan to progress single leg ability with stairs, uneven surfaces, and gait mechanics without pain.     Communication/Consultation:  None today  Equipment provided today:  None  Recommendations/Intent for next treatment session: Next visit will focus on L ankle ROM and strength.    >Total Treatment Billable Duration:  54 minutes   Time In: 1030  Time Out: 1130    YOLANDA DEVLIN PT         Charge Capture  [employer] [facility]  Appt Desk     Future Appointments   Date Time Provider Department Center   4/4/2024  4:30 PM Yolanda Devlin PT [facility]   4/9/2024  9:30 AM Yolanda Devlin PT [facility]   4/10/2024 12:30 PM Yolanda Devlin PT [facility]   4/15/2024 12:30 PM Yolanda Devlin PT

## 2024-04-04 ENCOUNTER — HOSPITAL ENCOUNTER (OUTPATIENT)
Dept: PHYSICAL THERAPY | Age: 59
Setting detail: RECURRING SERIES
Discharge: HOME | End: 2024-04-07
Attending: ORTHOPAEDIC SURGERY
Payer: COMMERCIAL

## 2024-04-04 PROCEDURE — 97140 MANUAL THERAPY 1/> REGIONS: CPT

## 2024-04-04 PROCEDURE — 97110 THERAPEUTIC EXERCISES: CPT

## 2024-04-04 ASSESSMENT — PAIN SCALES - GENERAL: PAINLEVEL_OUTOF10: 3

## 2024-04-04 NOTE — PROGRESS NOTES
verbal, manual, and tactile cues to promote proper body alignment, promote proper body posture, promote proper body mechanics, promote proper body breathing techniques, and proper technique .  Progressed resistance, range, repetitions, and complexity of movement as indicated.    Date: 04/04/24    LTR x2 min   Bridges x12 reps x5\" holds   Seated DF overpressure stretch with towel roll x2 min   BAPS board AP, laterally, and CW/CCW circles x12 B - level 1, cues, struggles   Gait in office and stepping drill with DF emphasis and eccentric control   Airex: step ups AP x12 reps, lateral step up and overs B x3 min, diagonal step on/off x2 min - fatigued in hips   Seated PF   Review of schedule and poc to conserve policy limit       Deferred   Sled: squats 2x10 reps lvl 5; SL Calf raise 3 cords x20 reps   Seated PF 8# DB x15 reps   Supine sciatic nerve glides x20 reps on L     MANUAL THERAPY: (10 minutes):   Joint mobilization and Soft tissue mobilization was utilized and necessary because of the patient's restricted joint motion, painful spasm, loss of articular motion, and restricted motion of soft tissue.   -Posterior talocrural glides grade 4  -talocrural distraction grade 4  -subtalar figure 4 and eversion mjm grade 3/4   -calf and achilles stm/mfr     Deferred    scar tissue stm/mfr at all scars with desensitization training (not today)      Treatment/Session Summary:    Treatment Assessment:   Shana did well today, she struggles with ankle stability. Plan to progress foot and ankle strength and control to minimize compensation and return to full function following surgery.     Communication/Consultation:  None today  Equipment provided today:  None  Recommendations/Intent for next treatment session: Next visit will focus on L ankle ROM and strength.    >Total Treatment Billable Duration:  54 minutes   Time In: 3734  Time Out: 0302    TILA DEVLIN PT         Charge Capture  [employer] Portal  Appt Desk

## 2024-04-09 ENCOUNTER — HOSPITAL ENCOUNTER (OUTPATIENT)
Dept: PHYSICAL THERAPY | Age: 59
Setting detail: RECURRING SERIES
Discharge: HOME | End: 2024-04-12
Attending: ORTHOPAEDIC SURGERY
Payer: COMMERCIAL

## 2024-04-09 PROCEDURE — 97140 MANUAL THERAPY 1/> REGIONS: CPT

## 2024-04-09 PROCEDURE — 97110 THERAPEUTIC EXERCISES: CPT

## 2024-04-09 ASSESSMENT — PAIN SCALES - GENERAL: PAINLEVEL_OUTOF10: 2

## 2024-04-09 NOTE — PROGRESS NOTES
4/15/2024 12:30 PM ElianaYolanda rodriguez, PT [facility]   4/17/2024  9:30 AM Yolanda Monroy, PT [facility]   4/23/2024 10:30 AM Yolanda Monroy, PT [facility]   5/10/2024 10:30 AM Yolanda Monroy, PT [facility]   5/21/2024  9:30 AM Yolanda Monroy, PT [facility]   5/28/2024 11:00 AM Antolin James MD [facility] AMB

## 2024-04-10 ENCOUNTER — APPOINTMENT (OUTPATIENT)
Dept: PHYSICAL THERAPY | Age: 59
End: 2024-04-10
Attending: ORTHOPAEDIC SURGERY
Payer: COMMERCIAL

## 2024-04-12 ENCOUNTER — PATIENT MESSAGE (OUTPATIENT)
Dept: ORTHOPEDIC SURGERY | Age: 59
End: 2024-04-12

## 2024-04-12 ENCOUNTER — APPOINTMENT (OUTPATIENT)
Dept: PHYSICAL THERAPY | Age: 59
End: 2024-04-12
Attending: ORTHOPAEDIC SURGERY
Payer: COMMERCIAL

## 2024-04-12 NOTE — TELEPHONE ENCOUNTER
From: Shana Avalos  To: Jodi Lemos  Sent: 4/12/2024 11:45 AM EDT  Subject: Appointment Request    Appointment Request From: Shana Avalos    With Provider: FRANCES Queen CNP [[facility]]    Preferred Date Range: 4/15/2024 – 4/26/2024    Preferred Times: Any Time    Reason for visit: Request an Appointment    Comments:  Need injection in my right hand.

## 2024-04-15 ENCOUNTER — HOSPITAL ENCOUNTER (OUTPATIENT)
Dept: PHYSICAL THERAPY | Age: 59
Setting detail: RECURRING SERIES
Discharge: HOME | End: 2024-04-18
Attending: ORTHOPAEDIC SURGERY
Payer: COMMERCIAL

## 2024-04-15 PROCEDURE — 97140 MANUAL THERAPY 1/> REGIONS: CPT

## 2024-04-15 PROCEDURE — 97110 THERAPEUTIC EXERCISES: CPT

## 2024-04-15 ASSESSMENT — PAIN SCALES - GENERAL: PAINLEVEL_OUTOF10: 2

## 2024-04-15 NOTE — PROGRESS NOTES
Shana Avalos  : 1965  Primary: [employer] ([place] [employer])  Secondary: GENERIC COMMERCIAL [facility] @ [place] [address] [place] [address]  Phone: [phone]  Fax: [phone] Plan Frequency: 2x per week for 8 weeks    Plan of Care/Certification Expiration Date: 24        Plan of Care/Certification Expiration Date:  Plan of Care/Certification Expiration Date: 24    Frequency/Duration:   Plan Frequency: 2x per week for 8 weeks      Time In/Out:   Time In: 1231  Time Out: 1330      PT Visit Info:    Progress Note Counter: 7      Visit Count:  14    OUTPATIENT PHYSICAL THERAPY:   Treatment Note 4/15/2024       Episode  (L foot surgery)               Treatment Diagnosis:    Pain in left foot  Difficulty in walking, not elsewhere classified  Medical/Referring Diagnosis:    Post-op pain  Post-op pain [G89.18]   Referring Physician:  Antolin James MD MD Orders:  PT Eval and Treat   Return MD Appt:  May 2024   Date of Onset:  Onset Date: 23     Allergies:   Patient has no known allergies.  Restrictions/Precautions:   Post Surgical Precautions:   WBAT; must remain in boot until 3/27/24; then can transition into tennis shoe       Interventions Planned (Treatment may consist of any combination of the following):     See Assessment Note    Subjective Comments:   Shana reported she did a lot this weekend, hosting people to her house, cleaning back porch, and wear sandals most of . She had notable swelling  night but it resolved this morning. She is not having much pain, aching on the arch of the foot.    Initial Pain Level::     2/10  Post Session Pain Level:       1/10  Medications Last Reviewed:  4/15/2024  Updated Objective Findings:    DF improving, limited end range anterior tibialis activation   Ankle mobility improving   Slight increased swelling lateral ankle   Treatment   THERAPEUTIC EXERCISE: (41 minutes):    Exercises

## 2024-04-16 ENCOUNTER — APPOINTMENT (OUTPATIENT)
Dept: PHYSICAL THERAPY | Age: 59
End: 2024-04-16
Attending: ORTHOPAEDIC SURGERY
Payer: COMMERCIAL

## 2024-04-16 ENCOUNTER — TRANSCRIBE ORDERS (OUTPATIENT)
Dept: SCHEDULING | Age: 59
End: 2024-04-16

## 2024-04-16 DIAGNOSIS — Z12.31 SCREENING MAMMOGRAM FOR HIGH-RISK PATIENT: Primary | ICD-10-CM

## 2024-04-17 ENCOUNTER — HOSPITAL ENCOUNTER (OUTPATIENT)
Dept: PHYSICAL THERAPY | Age: 59
Setting detail: RECURRING SERIES
Discharge: HOME | End: 2024-04-20
Attending: ORTHOPAEDIC SURGERY
Payer: COMMERCIAL

## 2024-04-17 PROCEDURE — 97140 MANUAL THERAPY 1/> REGIONS: CPT

## 2024-04-17 PROCEDURE — 97110 THERAPEUTIC EXERCISES: CPT

## 2024-04-17 ASSESSMENT — PAIN SCALES - GENERAL: PAINLEVEL_OUTOF10: 2

## 2024-04-17 NOTE — PROGRESS NOTES
Shana Avalos  : 1965  Primary: [employer] ([place] [employer])  Secondary: GENERIC COMMERCIAL [facility] @ [place] [address] [place] [address]  Phone: [phone]  Fax: [phone] Plan Frequency: 2x per week for 8 weeks    Plan of Care/Certification Expiration Date: 24        Plan of Care/Certification Expiration Date:  Plan of Care/Certification Expiration Date: 24    Frequency/Duration:   Plan Frequency: 2x per week for 8 weeks      Time In/Out:   Time In: 930  Time Out: 1028      PT Visit Info:    Progress Note Counter: 8      Visit Count:  15    OUTPATIENT PHYSICAL THERAPY:   Treatment Note 2024       Episode  (L foot surgery)               Treatment Diagnosis:    Pain in left foot  Difficulty in walking, not elsewhere classified  Medical/Referring Diagnosis:    Post-op pain  Post-op pain [G89.18]   Referring Physician:  Antolin James MD MD Orders:  PT Eval and Treat   Return MD Appt:  May 2024   Date of Onset:  Onset Date: 23     Allergies:   Patient has no known allergies.  Restrictions/Precautions:   Post Surgical Precautions:   WBAT; must remain in boot until 3/27/24; then can transition into tennis shoe       Interventions Planned (Treatment may consist of any combination of the following):     See Assessment Note    Subjective Comments:   Shana states the foot is good, she ran a bunch of errands and overall doing well, she does not feel restricted in anything except walking endurance and immediate gait after sitting for 30 minutes.     Initial Pain Level::     2/10  Post Session Pain Level:       1/10  Medications Last Reviewed:  2024  Updated Objective Findings:       Ankle mobility improving      Treatment   THERAPEUTIC EXERCISE: (45 minutes):    Exercises per grid below to improve mobility, strength, and balance.  Required moderate visual, verbal, manual, and tactile cues to promote proper body alignment,

## 2024-04-19 ENCOUNTER — APPOINTMENT (OUTPATIENT)
Dept: PHYSICAL THERAPY | Age: 59
End: 2024-04-19
Attending: ORTHOPAEDIC SURGERY
Payer: COMMERCIAL

## 2024-04-20 ENCOUNTER — HOSPITAL ENCOUNTER (OUTPATIENT)
Dept: MAMMOGRAPHY | Age: 59
End: 2024-04-20
Attending: OBSTETRICS & GYNECOLOGY
Payer: COMMERCIAL

## 2024-04-20 VITALS — WEIGHT: 195 LBS | HEIGHT: 62 IN | BODY MASS INDEX: 35.88 KG/M2

## 2024-04-20 DIAGNOSIS — Z12.31 SCREENING MAMMOGRAM FOR HIGH-RISK PATIENT: ICD-10-CM

## 2024-04-20 PROCEDURE — 77067 SCR MAMMO BI INCL CAD: CPT

## 2024-04-23 ENCOUNTER — HOSPITAL ENCOUNTER (OUTPATIENT)
Dept: PHYSICAL THERAPY | Age: 59
Setting detail: RECURRING SERIES
Discharge: HOME | End: 2024-04-26
Attending: ORTHOPAEDIC SURGERY
Payer: COMMERCIAL

## 2024-04-23 PROCEDURE — 97110 THERAPEUTIC EXERCISES: CPT

## 2024-04-23 ASSESSMENT — PAIN SCALES - GENERAL: PAINLEVEL_OUTOF10: 1

## 2024-04-23 NOTE — PROGRESS NOTES
proper body breathing techniques, and proper technique .  Progressed resistance, range, repetitions, and complexity of movement as indicated.    Date: 04/23/24    Review of sandals and foot wear- recommend brace with sandals   DF PROM for calf stretch - ankle mobility WNL   LTR x2 min   Bridges x12 reps x5\" holds   Seated DF overpressure stretch with towel roll x2 min   BAPS board AP, laterally, and CW/CCW circles x12 B - level 2, cues  Gait in office and stepping drill with DF emphasis and eccentric control   [place] x2 lengths - cue for eccentric lowering, no compensation or pain noted   Seated PF 8# DB x15 reps  Standing calf raises x15 reps - UE support, shoes on, no sxs   seated sciatic nerve glides x20 reps on L  Step ups 6\" x15 reps B - added eccentric lowering   6\" lateral step downs x15 reps B   Airex: step ups AP x12 reps, lateral step up and overs B x3 min, diagonal step on/off x2 min - fatigued in hips   HEP review and poc discussion     Deferred   Monster walks AP and PA   Lateral walks x1 length of office  Sled: squats x10 reps lvl 5; SL Calf raise 4 cords x20 reps   Step downs 4\" to fatigue - struggles with knee and quad control        Treatment/Session Summary:    Treatment Assessment:   Shana did very well today, minimal compensation with exercise. Plan to f/u in 1 month to see how independent management is progressing.     Communication/Consultation:  None today  Equipment provided today:  None  Recommendations/Intent for next treatment session: Next visit will focus on L ankle ROM and strength.    >Total Treatment Billable Duration:  54 minutes   Time In: 1030  Time Out: 1125    YOLANDA MONROY PT         [employer]  [employer] Portal  Appt Desk     Future Appointments   Date Time Provider Department Center   4/25/2024 10:40 AM Jodi Lemos APRN - CNP [facility]   5/21/2024  9:30 AM Yolanda Monroy PT [facility]   5/28/2024 11:00 AM Antolin James MD [facility]

## 2024-04-25 ENCOUNTER — OFFICE VISIT (OUTPATIENT)
Dept: ORTHOPEDIC SURGERY | Age: 59
End: 2024-04-25

## 2024-04-25 DIAGNOSIS — M18.0 ARTHRITIS OF CARPOMETACARPAL (CMC) JOINT OF BOTH THUMBS: Primary | ICD-10-CM

## 2024-04-25 DIAGNOSIS — M19.049 ARTHRITIS OF FINGER: ICD-10-CM

## 2024-04-25 RX ORDER — METHYLPREDNISOLONE ACETATE 40 MG/ML
40 INJECTION, SUSPENSION INTRA-ARTICULAR; INTRALESIONAL; INTRAMUSCULAR; SOFT TISSUE ONCE
Status: COMPLETED | OUTPATIENT
Start: 2024-04-25 | End: 2024-04-25

## 2024-04-25 RX ADMIN — METHYLPREDNISOLONE ACETATE 40 MG: 40 INJECTION, SUSPENSION INTRA-ARTICULAR; INTRALESIONAL; INTRAMUSCULAR; SOFT TISSUE at 11:44

## 2024-04-25 NOTE — PROGRESS NOTES
Orthopaedic Hand Surgery Note    Name: Shana Avalos  YOB: 1965  Gender: female  MRN: [id number]    CC: Follow up for right thumb Carpometacarpal Joint osteoarthritis    HPI: Patient is a 59 y.o. female who is here regarding follow up for right thumb CMC osteoarthritis.  She last received an injection October 19, 2023.  She says this injection helped until recently.  She also notes she is having some pain of her right index finger.    ROS/Meds/PSH/PMH/FH/SH: I personally reviewed the patients standard intake form.  Pertinents are discussed in the HPI    Physical Examination:  Musculoskeletal:   Examination of the right upper extremity demonstrates normal sensation in median, ulnar and radial distribution, negative carpal tunnel compression and Phalen test, cap refill < 5 seconds in all fingers.  Mild prominence and instability of the base of the first metacarpal. CMC grind is positive for pain and crepitus. Thumb MCP joint hyperextends 0 degrees. No pain at the radial styloid.  Patient has mild swelling over the MCP joint of the right index finger.  She is tender to palpation to the MCP joint.  She is able to make a full composite fist.    Imaging / Electrodiagnostic Tests:     X-rays include 4 view bilateral hands are reviewed.  Patient has thumb CMC arthritis bilaterally.  She has arthritis of the right index MCP joint.  She has arthritis of the right thumb MCP joint.    Assessment:     ICD-10-CM    1. Arthritis of carpometacarpal (CMC) joint of both thumbs  M18.0 XR HAND BILATERAL MINIMUM 3 VW     methylPREDNISolone acetate (DEPO-MEDROL) injection 40 mg      2. Arthritis of finger  M19.049           Plan:  We again discussed the diagnosis and different treatment options. We discussed observation, day/night splinting, cortisone injection(s) and surgical reconstruction with trapeziectomy and suspension arthroplasty and the risks and benefits of all were clearly outlined.  We discussed the

## 2024-04-26 ENCOUNTER — APPOINTMENT (OUTPATIENT)
Dept: PHYSICAL THERAPY | Age: 59
End: 2024-04-26
Attending: ORTHOPAEDIC SURGERY
Payer: COMMERCIAL

## 2024-04-30 ENCOUNTER — APPOINTMENT (OUTPATIENT)
Dept: PHYSICAL THERAPY | Age: 59
End: 2024-04-30
Attending: ORTHOPAEDIC SURGERY
Payer: COMMERCIAL

## 2024-05-03 ENCOUNTER — APPOINTMENT (OUTPATIENT)
Dept: PHYSICAL THERAPY | Age: 59
End: 2024-05-03
Attending: ORTHOPAEDIC SURGERY
Payer: COMMERCIAL

## 2024-05-09 ENCOUNTER — OFFICE VISIT (OUTPATIENT)
Age: 59
End: 2024-05-09
Payer: COMMERCIAL

## 2024-05-09 DIAGNOSIS — M18.12 ARTHRITIS OF CARPOMETACARPAL (CMC) JOINT OF LEFT THUMB: Primary | ICD-10-CM

## 2024-05-09 PROCEDURE — 20600 DRAIN/INJ JOINT/BURSA W/O US: CPT | Performed by: NURSE PRACTITIONER

## 2024-05-09 PROCEDURE — 99213 OFFICE O/P EST LOW 20 MIN: CPT | Performed by: NURSE PRACTITIONER

## 2024-05-09 RX ORDER — METHYLPREDNISOLONE ACETATE 40 MG/ML
40 INJECTION, SUSPENSION INTRA-ARTICULAR; INTRALESIONAL; INTRAMUSCULAR; SOFT TISSUE ONCE
Status: COMPLETED | OUTPATIENT
Start: 2024-05-09 | End: 2024-05-09

## 2024-05-09 RX ADMIN — METHYLPREDNISOLONE ACETATE 40 MG: 40 INJECTION, SUSPENSION INTRA-ARTICULAR; INTRALESIONAL; INTRAMUSCULAR; SOFT TISSUE at 15:01

## 2024-05-09 NOTE — PROGRESS NOTES
Orthopaedic Hand Surgery Note    Name: Shana Avalos  YOB: 1965  Gender: female  MRN: [id number]    CC: Follow up for left thumb Carpometacarpal Joint osteoarthritis    HPI: Patient is a 59 y.o. female who is here regarding follow up for left thumb CMC osteoarthritis.  She notes she is doing well.  I last saw her just 2 weeks ago for a right thumb CMC injection and right index MCP joint injection.  She reports great relief from those.    ROS/Meds/PSH/PMH/FH/SH: I personally reviewed the patients standard intake form.  Pertinents are discussed in the HPI    Physical Examination:  Musculoskeletal:   Examination of the left upper extremity demonstrates normal sensation in median, ulnar and radial distribution, negative carpal tunnel compression and Phalen test, cap refill < 5 seconds in all fingers.  Mild prominence and instability of the base of the first metacarpal. CMC grind is positive for pain and crepitus. Thumb MCP joint hyperextends 0 degrees. No pain at the radial styloid.    Imaging / Electrodiagnostic Tests:     None    Assessment:     ICD-10-CM    1. Arthritis of carpometacarpal (CMC) joint of left thumb  M18.12 methylPREDNISolone acetate (DEPO-MEDROL) injection 40 mg          Plan:  We again discussed the diagnosis and different treatment options. We discussed observation, day/night splinting, cortisone injection(s) and surgical reconstruction with trapeziectomy and suspension arthroplasty and the risks and benefits of all were clearly outlined.  We discussed the fact that the vast majority of thumb CMC arthritis causes persistent chronic pain and that surgical reconstruction is the endpoint for patients whose symptoms are not properly alleviated with conservative measures but the vast majority of patients end up having surgery. After discussing all options in detail the patient elects to proceed with left thumb CMC injection.  We will see her back as needed.    Procedure

## 2024-05-10 ENCOUNTER — APPOINTMENT (OUTPATIENT)
Dept: PHYSICAL THERAPY | Age: 59
End: 2024-05-10
Attending: ORTHOPAEDIC SURGERY
Payer: COMMERCIAL

## 2024-05-14 ENCOUNTER — APPOINTMENT (OUTPATIENT)
Dept: PHYSICAL THERAPY | Age: 59
End: 2024-05-14
Attending: ORTHOPAEDIC SURGERY
Payer: COMMERCIAL

## 2024-05-21 ENCOUNTER — HOSPITAL ENCOUNTER (OUTPATIENT)
Dept: PHYSICAL THERAPY | Age: 59
Setting detail: RECURRING SERIES
Discharge: HOME | End: 2024-05-24
Attending: ORTHOPAEDIC SURGERY
Payer: COMMERCIAL

## 2024-05-21 PROCEDURE — 97110 THERAPEUTIC EXERCISES: CPT

## 2024-05-21 ASSESSMENT — PAIN SCALES - GENERAL: PAINLEVEL_OUTOF10: 2

## 2024-05-21 NOTE — THERAPY RECERTIFICATION
Shana Avalos  : 1965  Primary: [employer] ([employer])  Secondary: GENERIC COMMERCIAL [facility] @ [place] [address] [place] [address]  Phone: [phone]  Fax: [phone] Plan Frequency: 4x/wk for 4 weeks    Plan of Care/Certification Expiration Date: 24        Plan of Care/Certification Expiration Date:  Plan of Care/Certification Expiration Date: 24    Frequency/Duration: Plan Frequency: 1x/wk every 2 weeks      Time In/Out:   Time In: 0940  Time Out: 1027      PT Visit Info:         Visit Count:  17                OUTPATIENT PHYSICAL THERAPY:             Recertification 2024               Episode (L foot surgery)         Treatment Diagnosis:     Pain in left foot  Difficulty in walking, not elsewhere classified  Medical/Referring Diagnosis:    Post-op pain    Referring Physician:  Antolin James MD MD Orders:  PT Eval and Treat   Return MD Appt:  24  Date of Onset:  Onset Date: 23     Allergies:  Patient has no known allergies.  Restrictions/Precautions:    Post Surgical Precautions: WBAT; must remain in boot until 3/27/24; then can transition into tennis shoe      Medications Last Reviewed:  2024     SUBJECTIVE   History of Injury/Illness (Reason for Referral):  Progress note 24:  Shana returns after 4 weeks away from therapy to assess her symptom management independently. Pt states she is doing all ADLs, yard work, walking in grocery stores, and had a trip to the beach without much issue. She states her swelling is managed, not having increased at the end of the day and she is not using the boot or a brace. Pt does state she is still having dorsal foot pain and numbness/tingling in her toes that seems to be \"the same\" as before surgery. She is having minimal difficulty sleeping, but wakes with leg cramps the last few weeks. Her greatest complaint at this time is immense stiffness after sitting >30

## 2024-05-21 NOTE — PROGRESS NOTES
Shana Avalos  : 1965  Primary: Jody [employer] [place] ([place] [employer])  Secondary: GENERIC COMMERCIAL [facility] @ [place] [address] [place] [address]  Phone: [phone]  Fax: [phone] Plan Frequency: 2x per week for 8 weeks    Plan of Care/Certification Expiration Date: 24        Plan of Care/Certification Expiration Date:  Plan of Care/Certification Expiration Date: 24    Frequency/Duration:   Plan Frequency: 2x per week for 8 weeks      Time In/Out:   Time In: 0940  Time Out: 1027      PT Visit Info:    Progress Note Counter: 0      Visit Count:  17    OUTPATIENT PHYSICAL THERAPY:   Treatment Note 2024       Episode  (L foot surgery)               Treatment Diagnosis:    Pain in left foot  Difficulty in walking, not elsewhere classified  Medical/Referring Diagnosis:    Post-op pain  Post-op pain [G89.18]   Referring Physician:  Antolin James MD MD Orders:  PT Eval and Treat   Return MD Appt:  May 2024   Date of Onset:  Onset Date: 23     Allergies:   Patient has no known allergies.  Restrictions/Precautions:   Post Surgical Precautions:   WBAT; must remain in boot until 3/27/24; then can transition into tennis shoe       Interventions Planned (Treatment may consist of any combination of the following):     See Assessment Note    Subjective Comments:   Shana states she is doing ok, see PN for details, she has not been doing HEP.   Pt arrived 10 min late     Initial Pain Level::     2/10  Post Session Pain Level:       0/10  Medications Last Reviewed:  2024  Updated Objective Findings:       Ankle mobility improving      Treatment   THERAPEUTIC EXERCISE: (45 minutes):    Exercises per grid below to improve mobility, strength, and balance.  Required moderate visual, verbal, manual, and tactile cues to promote proper body alignment, promote proper body posture, promote proper body mechanics, promote proper body breathing

## 2024-05-28 ENCOUNTER — OFFICE VISIT (OUTPATIENT)
Dept: ORTHOPEDIC SURGERY | Age: 59
End: 2024-05-28
Payer: COMMERCIAL

## 2024-05-28 DIAGNOSIS — G89.18 POST-OP PAIN: Primary | ICD-10-CM

## 2024-05-28 PROCEDURE — 99213 OFFICE O/P EST LOW 20 MIN: CPT | Performed by: ORTHOPAEDIC SURGERY

## 2024-05-28 RX ORDER — SEMAGLUTIDE 2.68 MG/ML
INJECTION, SOLUTION SUBCUTANEOUS
COMMUNITY
Start: 2024-04-25

## 2024-05-29 NOTE — PROGRESS NOTES
Name: Shana Avalos  YOB: 1965  Gender: female  MRN: [id number]    Plan:    Stable midfoot fusion.    Still having some pain underneath the fourth and fifth metatarsal heads but is she wear shoes that is fine.  Also with some hypersensitivity of the plantar foot    Follow-up in 6 months for 1 year visit with x-rays         Procedure: left 3/4 neuroma excison - Left and Left Midfoot Fusion - Left    Surgery Date: 11/22/2023      Subjective: Denies fevers chills or infection.  Denies any signs of spreading erythema.  Patient is doing well overall.  She denies any significant pain.  She is concerned today with the amount of swelling that she has after removal of the splint.  She has a friend who has a foot ulcer and is worried about removing sutures too early.  She wishes to have them kept in for a couple more days.    12/21/23- patient returns for suture removal of toes. States she is still having a significant amount pain but notes improved swelling. She has been compliant with NWB.    1/11/2024-patient reports that she is doing well.  She is having some issues with her last cast that was placed.  She notes that her pain and her swelling have improved.    2/27/24-she is doing much better with her pain as she is started moving.  She is not walking or standing yet.  She is issues with the balance to move.  She is planning to go to the beach in several months.  She also describe some pain in the fourth metatarsal head.    5/29/24-she states she has no pain in the top of her foot and she is able to walk around and be very active.  Her main issue is soreness underneath the fourth metatarsal head with some hypersensitivity issues in the plantar foot.  She has to wear shoes.  She states that this is controllable and not severe but it is new since.  It is gotten better over the past 3 months but is still present.    ROS: Patient Denies fever/chills, headache, visual changes, chest pain, shortness

## 2024-06-14 DIAGNOSIS — G56.01 RIGHT CARPAL TUNNEL SYNDROME: ICD-10-CM

## 2024-06-14 DIAGNOSIS — M18.0 ARTHRITIS OF CARPOMETACARPAL (CMC) JOINT OF BOTH THUMBS: ICD-10-CM

## 2024-06-14 RX ORDER — MELOXICAM 15 MG/1
15 TABLET ORAL DAILY
Qty: 90 TABLET | Refills: 0 | OUTPATIENT
Start: 2024-06-14

## 2024-09-26 ENCOUNTER — OFFICE VISIT (OUTPATIENT)
Age: 59
End: 2024-09-26
Payer: COMMERCIAL

## 2024-09-26 DIAGNOSIS — M18.0 ARTHRITIS OF CARPOMETACARPAL (CMC) JOINT OF BOTH THUMBS: Primary | ICD-10-CM

## 2024-09-26 PROCEDURE — 99214 OFFICE O/P EST MOD 30 MIN: CPT | Performed by: NURSE PRACTITIONER

## 2024-09-26 PROCEDURE — 20600 DRAIN/INJ JOINT/BURSA W/O US: CPT | Performed by: NURSE PRACTITIONER

## 2024-09-26 RX ORDER — METHYLPREDNISOLONE ACETATE 40 MG/ML
40 INJECTION, SUSPENSION INTRA-ARTICULAR; INTRALESIONAL; INTRAMUSCULAR; SOFT TISSUE ONCE
Status: COMPLETED | OUTPATIENT
Start: 2024-09-26 | End: 2024-09-26

## 2024-09-26 RX ADMIN — METHYLPREDNISOLONE ACETATE 40 MG: 40 INJECTION, SUSPENSION INTRA-ARTICULAR; INTRALESIONAL; INTRAMUSCULAR; SOFT TISSUE at 15:34

## 2024-09-28 DIAGNOSIS — M18.11 ARTHRITIS OF CARPOMETACARPAL (CMC) JOINT OF RIGHT THUMB: ICD-10-CM

## 2024-09-28 DIAGNOSIS — M65.321 TRIGGER FINGER, RIGHT INDEX FINGER: ICD-10-CM

## 2024-09-30 ENCOUNTER — PATIENT MESSAGE (OUTPATIENT)
Dept: ORTHOPEDIC SURGERY | Age: 59
End: 2024-09-30

## 2024-11-14 DIAGNOSIS — N95.2 VAGINAL ATROPHY: ICD-10-CM

## 2024-11-14 RX ORDER — ESTRADIOL 0.1 MG/G
1 CREAM VAGINAL
Qty: 42.5 G | Refills: 5 | OUTPATIENT
Start: 2024-11-14

## 2024-11-18 RX ORDER — ESTRADIOL 0.1 MG/G
1 CREAM VAGINAL
Qty: 42.5 G | Refills: 0 | Status: SHIPPED | OUTPATIENT
Start: 2024-11-18

## 2024-11-26 NOTE — PROGRESS NOTES
HPI  Shana Avalos is a 59 y.o. female seen for annual GYN exam.    Past Medical History, Past Surgical History, Family history, Social History, and Medications were all reviewed with the patient today and updated as necessary.     Current Outpatient Medications   Medication Sig    [START ON 12/5/2024] estradiol (VIVELLE) 0.1 MG/24HR Place 1 patch onto the skin Twice a Week APPLY 1 PATCH ON THE SKIN 2 TIMES A WEEK    [START ON 12/5/2024] estradiol (ESTRACE VAGINAL) 0.1 MG/GM vaginal cream Place 1 g vaginally Twice a Week    OZEMPIC, 2 MG/DOSE, 8 MG/3ML SOPN sc injection INJECT 2 MG SUBCUTANEOUSLY EVERY 7 DAYS    diclofenac sodium (VOLTAREN) 1 % GEL Apply 2 g topically 3 times daily for 28 days    aspirin (ASPIRIN 81) 81 MG EC tablet Take 1 tablet by mouth in the morning and at bedtime    atorvastatin (LIPITOR) 20 MG tablet TAKE 1 TABLET BY MOUTH EVERY DAY NIGHTLY    FARXIGA 10 MG tablet TAKE 1 TABLET ORALLY DAILY    minocycline (MINOCIN;DYNACIN) 50 MG capsule TAKE 1 CAPSULE (50 MG) DAILY INDICATIONS: ACNE    omeprazole (PRILOSEC) 40 MG delayed release capsule TAKE 1 CAPSULE BY MOUTH DAILY 30 MINUTES BEFORE BREAKFAST.    zolpidem (AMBIEN) 5 MG tablet     metoprolol succinate (TOPROL XL) 50 MG extended release tablet Take 1 tablet by mouth at bedtime    amitriptyline (ELAVIL) 10 MG tablet Take 1 tablet by mouth nightly    metFORMIN (GLUCOPHAGE) 500 MG tablet Take 1 tablet by mouth 4 times daily    diclofenac sodium (VOLTAREN) 1 % GEL Apply 2 g topically 3 times daily for 28 days (Patient not taking: Reported on 12/3/2024)    meloxicam (MOBIC) 15 MG tablet Take 1 tablet by mouth daily    ondansetron (ZOFRAN) 4 MG tablet Take 1 tablet by mouth daily as needed for Nausea or Vomiting (Patient not taking: Reported on 12/3/2024)    docusate sodium (COLACE) 100 MG capsule Take 1 capsule by mouth daily as needed for Constipation (Patient not taking: Reported on 12/3/2024)    triamcinolone (KENALOG) 0.1 % cream

## 2024-11-27 DIAGNOSIS — M65.321 TRIGGER FINGER, RIGHT INDEX FINGER: ICD-10-CM

## 2024-11-27 DIAGNOSIS — M18.11 ARTHRITIS OF CARPOMETACARPAL (CMC) JOINT OF RIGHT THUMB: ICD-10-CM

## 2024-12-03 ENCOUNTER — OFFICE VISIT (OUTPATIENT)
Dept: OBGYN CLINIC | Age: 59
End: 2024-12-03
Payer: COMMERCIAL

## 2024-12-03 VITALS
SYSTOLIC BLOOD PRESSURE: 130 MMHG | BODY MASS INDEX: 36.44 KG/M2 | WEIGHT: 198 LBS | DIASTOLIC BLOOD PRESSURE: 80 MMHG | HEIGHT: 62 IN

## 2024-12-03 DIAGNOSIS — N95.2 VAGINAL ATROPHY: ICD-10-CM

## 2024-12-03 DIAGNOSIS — N95.1 MENOPAUSE SYNDROME: ICD-10-CM

## 2024-12-03 DIAGNOSIS — Z01.419 ENCOUNTER FOR WELL WOMAN EXAM WITH ROUTINE GYNECOLOGICAL EXAM: Primary | ICD-10-CM

## 2024-12-03 PROCEDURE — 99396 PREV VISIT EST AGE 40-64: CPT | Performed by: OBSTETRICS & GYNECOLOGY

## 2024-12-03 RX ORDER — ESTRADIOL 0.1 MG/D
1 FILM, EXTENDED RELEASE TRANSDERMAL
Qty: 24 PATCH | Refills: 4 | Status: SHIPPED | OUTPATIENT
Start: 2024-12-05

## 2024-12-03 RX ORDER — ESTRADIOL 0.1 MG/G
1 CREAM VAGINAL
Qty: 42.5 G | Refills: 5 | Status: SHIPPED | OUTPATIENT
Start: 2024-12-05

## 2024-12-16 ENCOUNTER — OFFICE VISIT (OUTPATIENT)
Dept: ORTHOPEDIC SURGERY | Age: 59
End: 2024-12-16
Payer: COMMERCIAL

## 2024-12-16 DIAGNOSIS — M19.172 POST-TRAUMATIC OSTEOARTHRITIS OF LEFT FOOT: ICD-10-CM

## 2024-12-16 DIAGNOSIS — M77.42 METATARSALGIA OF LEFT FOOT: ICD-10-CM

## 2024-12-16 DIAGNOSIS — R52 PAIN: Primary | ICD-10-CM

## 2024-12-16 PROCEDURE — M5020 MISC METATARSAL COOKIES: HCPCS | Performed by: PHYSICIAN ASSISTANT

## 2024-12-16 PROCEDURE — 99213 OFFICE O/P EST LOW 20 MIN: CPT | Performed by: PHYSICIAN ASSISTANT

## 2024-12-16 NOTE — PROGRESS NOTES
Name: Shana Avalos  YOB: 1965  Gender: female  MRN: [id number]    Plan:    Stable midfoot fusion.    Still having some pain underneath the third and fourth metatarsal heads and extending into the toes.  She notes significant numbness and tingling into these toes.  New onset metatarsalgia-metatarsal cookie given today.  PowerStep inserts with metatarsal bar to be ordered    Follow-up at her regular scheduled follow-up with Dr. James on 1/7/2025         Procedure: left 3/4 neuroma excison - Left and Left Midfoot Fusion - Left    Surgery Date: 11/22/2023      Subjective: Denies fevers chills or infection.  Denies any signs of spreading erythema.  Patient is doing well overall.  She denies any significant pain.  She is concerned today with the amount of swelling that she has after removal of the splint.  She has a friend who has a foot ulcer and is worried about removing sutures too early.  She wishes to have them kept in for a couple more days.    12/21/23- patient returns for suture removal of toes. States she is still having a significant amount pain but notes improved swelling. She has been compliant with NWB.    1/11/2024-patient reports that she is doing well.  She is having some issues with her last cast that was placed.  She notes that her pain and her swelling have improved.    2/27/24-she is doing much better with her pain as she is started moving.  She is not walking or standing yet.  She is issues with the balance to move.  She is planning to go to the beach in several months.  She also describe some pain in the fourth metatarsal head.    5/29/24-she states she has no pain in the top of her foot and she is able to walk around and be very active.  Her main issue is soreness underneath the fourth metatarsal head with some hypersensitivity issues in the plantar foot.  She has to wear shoes.  She states that this is controllable and not severe but it is new since.  It is gotten better

## 2024-12-16 NOTE — PROGRESS NOTES
Patient was fitted and instructed on Metatarsal Cookies for the left foot. Patient read and signed documenting they understand and agree to [facility]'s current DME return policy.

## 2024-12-19 ENCOUNTER — OFFICE VISIT (OUTPATIENT)
Age: 59
End: 2024-12-19

## 2024-12-19 DIAGNOSIS — M18.12 ARTHRITIS OF CARPOMETACARPAL (CMC) JOINT OF LEFT THUMB: ICD-10-CM

## 2024-12-19 DIAGNOSIS — M18.11 ARTHRITIS OF CARPOMETACARPAL (CMC) JOINT OF RIGHT THUMB: Primary | ICD-10-CM

## 2024-12-19 DIAGNOSIS — M19.049 ARTHRITIS OF FINGER: ICD-10-CM

## 2024-12-19 RX ORDER — METHYLPREDNISOLONE ACETATE 40 MG/ML
40 INJECTION, SUSPENSION INTRA-ARTICULAR; INTRALESIONAL; INTRAMUSCULAR; SOFT TISSUE ONCE
Status: SHIPPED | OUTPATIENT
Start: 2024-12-19

## 2024-12-19 NOTE — PROGRESS NOTES
alleviated with conservative measures but the vast majority of patients end up having surgery. After discussing all options in detail the patient elects to proceed with injections for bilateral thumb CMC joints and right index MCP joint.  I will see her back as needed.    Procedure Note    The risk, benefits and alternatives of injection and no injection therapy were discussed.  Risks including skin blanching, subcutaneous fat atrophy, and elevations in blood glucose levels were discussed. The patient consented for an injection. The patient has been identified by name and birthdate. The injection site was identified, marked and prepped with a alcohol swab. Time out completed. The right index MCP  was injected with 1ml of 40mg/ml Depomedrol and 1ml xylocaine plain 1%. The injection site was then dressed with a bandaid. The patient tolerated the injection well. The patient was instructed to monitor their blood sugars if diabetic and call if any concerns. The patient was instructed to call the office if any adverse local effects occurred or any if any questions or concerns arise.     Procedure Note    The risk, benefits and alternatives of injection and no injection therapy were discussed. Risks including skin blanching, subcutaneous fat atrophy, and elevations in blood glucose levels were discussed.  The patient consented for an injection. Time out performed. The patient has been identified by name and birthdate. The injection site was identified, marked and prepped with a alcohol swab. The bilateral thumb CMC joint was injected with 0.5ml of 40mg/ml Depomedrol and 0.5ml of Lidocaine plain 1%. The injection site was then dressed with a bandaid. The patient tolerated the injection well. The patient was instructed to monitor their blood sugars if diabetic and call if any concerns. The patient was instructed to call the office if any adverse local effects occurred or any if any questions or concerns arise.     Overall

## 2025-01-07 ENCOUNTER — OFFICE VISIT (OUTPATIENT)
Dept: ORTHOPEDIC SURGERY | Age: 60
End: 2025-01-07
Payer: COMMERCIAL

## 2025-01-07 DIAGNOSIS — M77.42 METATARSALGIA OF LEFT FOOT: Primary | ICD-10-CM

## 2025-01-07 PROCEDURE — 99213 OFFICE O/P EST LOW 20 MIN: CPT | Performed by: ORTHOPAEDIC SURGERY

## 2025-01-07 NOTE — PROGRESS NOTES
Name: Shana Avalos  YOB: 1965  Gender: female  MRN: [id number]    Plan:    Stable midfoot fusion.    Main issue is fat pad atrophy with metatarsalgia.  Continue cushioning inserts.    From standpoint she may need second third and fourth Weil osteotomies with hammertoe correction but at this point we both agree that surgery is not a good option.  We only do so if she develops increasing pain or calluses         Procedure: left 3/4 neuroma excison - Left and Left Midfoot Fusion - Left    Surgery Date: 11/22/2023      Subjective: Her midfoot pain is dramatically improved.  Her main issue at this point is the fact that she is having pain at the ball of her foot along with fat pad atrophy.  She is has a lot of questions about the surgery went pinned her toes a certain way and why did not pin the toes and the tip.  She also wants to know why she feels that she is walking all rocks and cannot walk barefooted.  She states a lot of his problems were not present before surgery but after surgery she now.  She is walking on walks.  She cannot walk barefooted.  She also describes full leg cramping and significant cramping in her lower extremities that happens even at rest and with activity.  She wants to know if this can be addressed today    ROS: Patient Denies fever/chills, headache, visual changes, chest pain, shortness of breath, and nausea/vomiting/diarrhea     Exam:     Wounds: appears to be healing well with good reapproximation, healing well ,   No signs of infection.  [facility].  Midfoot swelling edema noted.  Achilles tendon is intact.  Heel wound is intact.  No signs of infection.  Third and fourth toe still deviated into a valgus position.  Tender to palpation under the metatarsal heads.  Achilles is tight.  Early callus underneath the third metatarsal head     Vascular: BLE: 2+ DP pulse, toes wwp w/ BCR<2s    Imaging:   Interpretation of imaging previously  X-Ray LEFT Foot 3 vw

## 2025-01-15 PROBLEM — R52 PAIN: Status: RESOLVED | Noted: 2024-12-16 | Resolved: 2025-01-15

## 2025-03-13 ENCOUNTER — OFFICE VISIT (OUTPATIENT)
Age: 60
End: 2025-03-13
Payer: COMMERCIAL

## 2025-03-13 DIAGNOSIS — M79.641 PAIN OF RIGHT HAND: Primary | ICD-10-CM

## 2025-03-13 PROCEDURE — 99214 OFFICE O/P EST MOD 30 MIN: CPT | Performed by: NURSE PRACTITIONER

## 2025-03-13 RX ORDER — METHYLPREDNISOLONE 4 MG/1
TABLET ORAL
Qty: 1 KIT | Refills: 0 | Status: SHIPPED | OUTPATIENT
Start: 2025-03-13

## 2025-03-13 RX ORDER — MELOXICAM 15 MG/1
15 TABLET ORAL DAILY
Qty: 28 TABLET | Refills: 0 | Status: SHIPPED | OUTPATIENT
Start: 2025-03-13 | End: 2025-04-10

## 2025-03-13 NOTE — PROGRESS NOTES
Orthopaedic Hand Clinic Note    Name: Shana Avalos  YOB: 1965  Gender: female  MRN: [id number]      New problem:  1. Pain of right hand        HPI: Shana Avalos is a 60 y.o. female who comes in today for right hand pain.  She is right-hand dominant.  She reports on March 5, 2025 she was using a vacuum tool to clean the rug under a bed.  She said she hit the dorsal aspect of her right hand on the bed.  She noticed bruising and swelling soon after.  She comes in today for evaluation.  She says her hand looks worse than it actually feels.  She got concerned with the bruising.    ROS/Meds/PSH/PMH/FH/SH: I personally reviewed the patients standard intake form.  Pertinents are discussed in the HPI    Physical Examination:    Musculoskeletal Examination:  Examination on the right upper extremity demonstrates cap refill < 5 seconds in all fingers  Patient has swelling and ecchymosis over the dorsal aspect of the right hand, primarily over the index finger MCP joint.  She is tender to palpation proximal to the index finger MCP joint.  Patient has mild tenderness to the index finger MCP joint.  Patient is able to make a full composite fist.  She has full extension of her digits.  She denies paresthesia.  She has good capillary refill.    Imaging / Electrodiagnostic Tests:     X-rays include a 3 view right hand are reviewed.  No fracture is identified.  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    Assessment:     ICD-10-CM    1. Pain of right hand  M79.641 XR HAND RIGHT (MIN 3 VIEWS)     Ambulatory Referral to [facility]          Plan:   We discussed the diagnosis and different treatment options. We discussed observation, therapy, antiinflammatory medications and other pertinent treatment modalities.    After discussing in detail the patient elects to proceed with Medrol, Mobic, compressive sleeve.  I do not see an obvious fracture.  She does have

## 2025-04-07 DIAGNOSIS — M79.641 PAIN OF RIGHT HAND: ICD-10-CM

## 2025-04-07 RX ORDER — MELOXICAM 15 MG/1
15 TABLET ORAL DAILY
Qty: 28 TABLET | Refills: 0 | OUTPATIENT
Start: 2025-04-07

## 2025-04-11 ENCOUNTER — OFFICE VISIT (OUTPATIENT)
Age: 60
End: 2025-04-11

## 2025-04-11 DIAGNOSIS — M18.12 ARTHRITIS OF CARPOMETACARPAL (CMC) JOINT OF LEFT THUMB: ICD-10-CM

## 2025-04-11 DIAGNOSIS — M18.11 ARTHRITIS OF CARPOMETACARPAL (CMC) JOINT OF RIGHT THUMB: Primary | ICD-10-CM

## 2025-04-11 RX ORDER — BETAMETHASONE SODIUM PHOSPHATE AND BETAMETHASONE ACETATE 3; 3 MG/ML; MG/ML
6 INJECTION, SUSPENSION INTRA-ARTICULAR; INTRALESIONAL; INTRAMUSCULAR; SOFT TISSUE ONCE
Status: COMPLETED | OUTPATIENT
Start: 2025-04-11 | End: 2025-04-11

## 2025-04-11 RX ORDER — BETAMETHASONE SODIUM PHOSPHATE AND BETAMETHASONE ACETATE 3; 3 MG/ML; MG/ML
6 INJECTION, SUSPENSION INTRA-ARTICULAR; INTRALESIONAL; INTRAMUSCULAR; SOFT TISSUE ONCE
Status: CANCELLED | OUTPATIENT
Start: 2025-04-11 | End: 2025-04-11

## 2025-04-11 RX ADMIN — BETAMETHASONE SODIUM PHOSPHATE AND BETAMETHASONE ACETATE 6 MG: 3; 3 INJECTION, SUSPENSION INTRA-ARTICULAR; INTRALESIONAL; INTRAMUSCULAR; SOFT TISSUE at 11:53

## 2025-04-11 NOTE — PROGRESS NOTES
end up having surgery. After discussing all options in detail the patient elects to proceed with bilateral thumb CMC injections.  She will continue with her braces.  I will see her back as needed.    Procedure Note    The risk, benefits and alternatives of injection and no injection therapy were discussed. Risks including skin blanching, subcutaneous fat atrophy, and elevations in blood glucose levels were discussed.  The patient consented for an injection. Time out performed. The patient has been identified by name and birthdate. The injection site was identified, marked and prepped with a alcohol swab. The bilateral thumb CMC joint was injected with 0.5ml of 6mg/ml Celestone and 0.5ml of Lidocaine plain 1%. The injection site was then dressed with a bandaid. The patient tolerated the injection well. The patient was instructed to monitor their blood sugars if diabetic and call if any concerns. The patient was instructed to call the office if any adverse local effects occurred or any if any questions or concerns arise.      Overall time of this visit taking into account reviewing the chart, face to face time with the patient counseling him on his condition as well as documentation after chart was over 30 minutes.     Patient voiced accordance and understanding of the information provided and the formulated plan. All questions were answered to the patient's satisfaction during the encounter.    4 This is a chronic illness with exacerbation, progression, or side effect of treatment  Treatment at this time:Minor procedure: cortisone injection. I discussed the risk of skin blanching and hyperglycemia.  I discussed the symptoms of hyperglycemia such as confusion, lethargy, polyuria and polydipsia.  If any of these symptoms occur the patient is to present to an urgent care facility or primary care doctor for blood sugar evaluation.    FRANCES Queen - CNP  Orthopaedic Surgery  04/11/25  11:55 AM

## 2025-05-03 ENCOUNTER — HOSPITAL ENCOUNTER (OUTPATIENT)
Dept: MAMMOGRAPHY | Age: 60
End: 2025-05-03
Payer: COMMERCIAL

## 2025-05-03 VITALS — HEIGHT: 62 IN | BODY MASS INDEX: 36.44 KG/M2 | WEIGHT: 198 LBS

## 2025-05-03 DIAGNOSIS — Z12.31 ENCOUNTER FOR SCREENING MAMMOGRAM FOR BREAST CANCER: ICD-10-CM

## 2025-05-03 PROCEDURE — 77063 BREAST TOMOSYNTHESIS BI: CPT

## 2025-05-13 ENCOUNTER — RESULTS FOLLOW-UP (OUTPATIENT)
Dept: OBGYN CLINIC | Age: 60
End: 2025-05-13

## 2025-05-21 ENCOUNTER — OFFICE VISIT (OUTPATIENT)
Age: 60
End: 2025-05-21
Payer: COMMERCIAL

## 2025-05-21 DIAGNOSIS — M79.641 PAIN OF RIGHT HAND: Primary | ICD-10-CM

## 2025-05-21 DIAGNOSIS — M79.641 PAIN OF RIGHT HAND: ICD-10-CM

## 2025-05-21 DIAGNOSIS — M79.89 SWELLING OF RIGHT HAND: ICD-10-CM

## 2025-05-21 DIAGNOSIS — M19.049 ARTHRITIS OF FINGER: ICD-10-CM

## 2025-05-21 LAB
BASOPHILS # BLD: 0.03 K/UL (ref 0–0.2)
BASOPHILS NFR BLD: 0.6 % (ref 0–2)
CRP SERPL-MCNC: 1.4 MG/DL (ref 0–0.4)
DIFFERENTIAL METHOD BLD: NORMAL
EOSINOPHIL # BLD: 0.12 K/UL (ref 0–0.8)
EOSINOPHIL NFR BLD: 2.5 % (ref 0.5–7.8)
ERYTHROCYTE [DISTWIDTH] IN BLOOD BY AUTOMATED COUNT: 12.5 % (ref 11.9–14.6)
ERYTHROCYTE [SEDIMENTATION RATE] IN BLOOD: 26 MM/HR (ref 0–30)
HCT VFR BLD AUTO: 40.3 % (ref 35.8–46.3)
HGB BLD-MCNC: 13.1 G/DL (ref 11.7–15.4)
IMM GRANULOCYTES # BLD AUTO: 0.02 K/UL (ref 0–0.5)
IMM GRANULOCYTES NFR BLD AUTO: 0.4 % (ref 0–5)
LYMPHOCYTES # BLD: 1.31 K/UL (ref 0.5–4.6)
LYMPHOCYTES NFR BLD: 27.2 % (ref 13–44)
MCH RBC QN AUTO: 30.3 PG (ref 26.1–32.9)
MCHC RBC AUTO-ENTMCNC: 32.5 G/DL (ref 31.4–35)
MCV RBC AUTO: 93.3 FL (ref 82–102)
MONOCYTES # BLD: 0.42 K/UL (ref 0.1–1.3)
MONOCYTES NFR BLD: 8.7 % (ref 4–12)
NEUTS SEG # BLD: 2.92 K/UL (ref 1.7–8.2)
NEUTS SEG NFR BLD: 60.6 % (ref 43–78)
NRBC # BLD: 0 K/UL (ref 0–0.2)
PLATELET # BLD AUTO: 302 K/UL (ref 150–450)
PMV BLD AUTO: 9.6 FL (ref 9.4–12.3)
RBC # BLD AUTO: 4.32 M/UL (ref 4.05–5.2)
URATE SERPL-MCNC: 5.7 MG/DL (ref 2.5–7.1)
WBC # BLD AUTO: 4.8 K/UL (ref 4.3–11.1)

## 2025-05-21 PROCEDURE — 99214 OFFICE O/P EST MOD 30 MIN: CPT | Performed by: NURSE PRACTITIONER

## 2025-05-21 RX ORDER — METHYLPREDNISOLONE 4 MG/1
TABLET ORAL
Qty: 1 KIT | Refills: 0 | Status: SHIPPED | OUTPATIENT
Start: 2025-05-21

## 2025-05-21 RX ORDER — MELOXICAM 15 MG/1
15 TABLET ORAL DAILY
Qty: 28 TABLET | Refills: 0 | Status: SHIPPED | OUTPATIENT
Start: 2025-05-21 | End: 2025-06-18

## 2025-05-21 NOTE — PROGRESS NOTES
has an upcoming mammogram and ultrasound this Friday.  She will reach out to me on Tuesday and we will reassess how she is doing.     Patient voiced accordance and understanding of the information provided and the formulated plan. All questions were answered to the patient's satisfaction during the encounter.    4 This is an undiagnosed new problem with uncertain prognosis  Treatment at this time: Prescription medication and LABS    FRANCES Queen - CNP  Orthopaedic Surgery  05/21/25  12:45 PM

## 2025-05-23 ENCOUNTER — HOSPITAL ENCOUNTER (OUTPATIENT)
Dept: MAMMOGRAPHY | Age: 60
Discharge: HOME | End: 2025-05-26
Attending: OBSTETRICS & GYNECOLOGY
Payer: COMMERCIAL

## 2025-05-23 ENCOUNTER — APPOINTMENT (OUTPATIENT)
Dept: MAMMOGRAPHY | Age: 60
End: 2025-05-23
Attending: OBSTETRICS & GYNECOLOGY
Payer: COMMERCIAL

## 2025-05-23 DIAGNOSIS — R92.8 ABNORMAL FINDING ON BREAST IMAGING: ICD-10-CM

## 2025-05-23 PROCEDURE — 76642 ULTRASOUND BREAST LIMITED: CPT

## 2025-05-27 ENCOUNTER — RESULTS FOLLOW-UP (OUTPATIENT)
Dept: OBGYN CLINIC | Age: 60
End: 2025-05-27

## 2025-06-03 ENCOUNTER — OFFICE VISIT (OUTPATIENT)
Age: 60
End: 2025-06-03
Payer: COMMERCIAL

## 2025-06-03 DIAGNOSIS — M79.641 PAIN OF RIGHT HAND: ICD-10-CM

## 2025-06-03 DIAGNOSIS — M19.049 ARTHRITIS OF FINGER: ICD-10-CM

## 2025-06-03 DIAGNOSIS — M79.89 SWELLING OF RIGHT HAND: Primary | ICD-10-CM

## 2025-06-03 PROCEDURE — 99214 OFFICE O/P EST MOD 30 MIN: CPT | Performed by: NURSE PRACTITIONER

## 2025-06-03 PROCEDURE — 20600 DRAIN/INJ JOINT/BURSA W/O US: CPT | Performed by: NURSE PRACTITIONER

## 2025-06-03 RX ORDER — METHYLPREDNISOLONE ACETATE 40 MG/ML
40 INJECTION, SUSPENSION INTRA-ARTICULAR; INTRALESIONAL; INTRAMUSCULAR; SOFT TISSUE ONCE
Status: COMPLETED | OUTPATIENT
Start: 2025-06-03 | End: 2025-06-03

## 2025-06-03 RX ORDER — METHYLPREDNISOLONE 4 MG/1
TABLET ORAL
Qty: 1 KIT | Refills: 3 | Status: SHIPPED | OUTPATIENT
Start: 2025-06-03

## 2025-06-03 RX ADMIN — METHYLPREDNISOLONE ACETATE 40 MG: 40 INJECTION, SUSPENSION INTRA-ARTICULAR; INTRALESIONAL; INTRAMUSCULAR; SOFT TISSUE at 15:48

## 2025-06-04 NOTE — PROGRESS NOTES
been at the beach in [place] for the last month.  She has had left thumb pain.  She has upcoming foot and ankle surgery with Dr. James.     7/6/23 Shana Avalos is a 58 y.o. female right hand dominant with a chief complaint of right wrist pain.  She reports Friday through Monday of last week she was wearing 2 bracelets on her right wrist.  She denies any injury.  She reports Monday evening her wrist was very sore.  She said by Tuesday morning it was very swollen and tender to touch.  She reports that it his slightly improved.  She did do some yard work this morning without difficulty.  She has tried Voltaren gel.  She has also tried her CMC brace.     4/5/23 Patient is a 58 y.o. female with a chief complaint of bilateral thumb pain and weakness. The patient complains of increased pain with activities involving pinch and  (ex/ opening jars, turning car key, buttons). The symptoms have been present for years. Evaluation to date has included none. Treatment to date has included none.  She reports a long history of right carpal tunnel syndrome.  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  She bought a brace and it went away for approximately 22 years until she was pregnant with her daughter.  She said it went away after delivery.  This was approximately 23 years ago.  She has noted waking up in the morning with numbness in the right thumb, index, middle fingers.       ROS/Meds/PSH/PMH/FH/SH: I personally reviewed the patients standard intake form.  Pertinents are discussed in the HPI    Physical Examination:  General: Awake and alert.  HEENT: Normocephalic, atraumatic  CV/Pulm: Breathing even and unlabored  Skin: No obvious rashes noted.  Lymphatic: No obvious evidence of lymphedema or lymphadenopathy    Musculoskeletal Examination:  Examination on the right upper extremity demonstrates cap refill < 5 seconds in all fingers  Patient has swelling to the right index finger MCP joint.

## 2025-06-05 ENCOUNTER — HOSPITAL ENCOUNTER (OUTPATIENT)
Dept: MAMMOGRAPHY | Age: 60
Discharge: HOME | End: 2025-06-08
Attending: OBSTETRICS & GYNECOLOGY
Payer: COMMERCIAL

## 2025-06-05 DIAGNOSIS — R92.8 ABNORMAL FINDING ON BREAST IMAGING: ICD-10-CM

## 2025-06-05 PROCEDURE — A4648 IMPLANTABLE TISSUE MARKER: HCPCS

## 2025-06-05 PROCEDURE — 77065 DX MAMMO INCL CAD UNI: CPT

## 2025-06-05 PROCEDURE — 88305 TISSUE EXAM BY PATHOLOGIST: CPT

## 2025-06-05 PROCEDURE — 6360000002 HC RX W HCPCS: Performed by: OBSTETRICS & GYNECOLOGY

## 2025-06-05 RX ORDER — LIDOCAINE HYDROCHLORIDE 10 MG/ML
5 INJECTION, SOLUTION INFILTRATION; PERINEURAL ONCE
Status: COMPLETED | OUTPATIENT
Start: 2025-06-05 | End: 2025-06-05

## 2025-06-05 RX ADMIN — LIDOCAINE HYDROCHLORIDE 5 ML: 10 INJECTION, SOLUTION INFILTRATION; PERINEURAL at 09:38

## 2025-06-09 ENCOUNTER — TELEPHONE (OUTPATIENT)
Dept: MAMMOGRAPHY | Age: 60
End: 2025-06-09

## 2025-06-09 NOTE — TELEPHONE ENCOUNTER
I spoke with the patient regarding the results of her recent breast biopsy. Pathology: benign. She had no post biopsy issues or concerns and will return in May 2026 for her yearly screening mammogram.

## 2025-06-15 DIAGNOSIS — M79.641 PAIN OF RIGHT HAND: ICD-10-CM

## 2025-06-15 DIAGNOSIS — M79.89 SWELLING OF RIGHT HAND: ICD-10-CM

## 2025-06-16 RX ORDER — MELOXICAM 15 MG/1
15 TABLET ORAL DAILY
Qty: 28 TABLET | Refills: 0 | OUTPATIENT
Start: 2025-06-16

## 2025-06-19 ENCOUNTER — TELEPHONE (OUTPATIENT)
Dept: ORTHOPEDIC SURGERY | Age: 60
End: 2025-06-19

## 2025-06-19 NOTE — TELEPHONE ENCOUNTER
----- Message from RUBEN SOSA MA sent at 6/19/2025  4:41 PM EDT -----  Regarding: FW: Specialist Appointment Request - Established    ----- Message -----  From: Elizabeth Vazquez  Sent: 6/19/2025   3:16 PM EDT  To: #  Subject: Specialist Appointment Request - Established     Specialist Appointment Request - Established    What Specialty? Select Speciality: Orthopedics     Type of Appointment: Appointment Type: Follow-Up    Reason for appointment?  Finger popping and out of joint,     Scheduling Preference per Patient: asap      Additional Information: finger popping out of joint  --------------------------------------------------------------------------------------------------------------------------    Relationship to Patient: Self  Call Back Information: OK to leave message on voicemail  Preferred Call Back Number: Phone [phone]

## 2025-06-20 NOTE — TELEPHONE ENCOUNTER
I returned patient call. She states the she pulled a plug/stem out of a blow up ball on 6/14/25 and felt a pop. Since then she has noticed increased pain and locking in her index finger. It does not bother her at all times. She notices these problems more often in the evening after using her hand all day. I have explained that Jodi is out of the office until 6/30/2025. I have offered to schedule her with Dr. Soto for evaluation. She declines at this time. She is going to rest and ice and will call back next week if she feels like she needs to be seen.

## 2025-06-25 ENCOUNTER — HOSPITAL ENCOUNTER (OUTPATIENT)
Dept: PHYSICAL THERAPY | Age: 60
Setting detail: RECURRING SERIES
Discharge: HOME | End: 2025-06-28
Payer: COMMERCIAL

## 2025-06-25 DIAGNOSIS — M54.59 OTHER LOW BACK PAIN: Primary | ICD-10-CM

## 2025-06-25 DIAGNOSIS — M43.16 SPONDYLOLISTHESIS AT L4-L5 LEVEL: ICD-10-CM

## 2025-06-25 DIAGNOSIS — M48.062 SPINAL STENOSIS, LUMBAR REGION WITH NEUROGENIC CLAUDICATION: ICD-10-CM

## 2025-06-25 DIAGNOSIS — G89.29 OTHER CHRONIC PAIN: ICD-10-CM

## 2025-06-25 DIAGNOSIS — M25.541 PAIN IN JOINTS OF RIGHT HAND: ICD-10-CM

## 2025-06-25 PROCEDURE — 97162 PT EVAL MOD COMPLEX 30 MIN: CPT

## 2025-06-25 PROCEDURE — 97140 MANUAL THERAPY 1/> REGIONS: CPT

## 2025-06-25 ASSESSMENT — PAIN SCALES - GENERAL: PAINLEVEL_OUTOF10: 5

## 2025-06-25 NOTE — THERAPY EVALUATION
NEXT SESSION   Manual Muscle Test (*/5) Right Left Symptoms & Quality    Knee extension      Knee flexion      Hip flexion      Hip ER      Hip IR      Hip extension      Hip abduction      Hip adduction      Ankle DF      Ankle PF              Hand Assessment:   Slight swan neck deformity of digit 2 and 4   WNL stretch of all tendons without significant compensation   Posturing: first finger adducts over middle finger, thumb ADD also   Notable trigger point of thenar eminence and extensor digitorum R hand   (+) varus stress test of R MCP     Outcome Measure:   Tool Used: Modified Oswestry Low Back Pain Questionnaire  Score:  Initial: TBD/50  Most Recent: X/50 (Date: -- )   Interpretation of Score: Each section is scored on a 0-5 scale, 5 representing the greatest disability.  The scores of each section are added together for a total score of 50.      ASSESSMENT   Initial Assessment:    Shana presents with a chief complaint of chronic L4/5 back pain that has been irritated the last 2 months and R 2nd digit pain at the MCP. PT examination revealed notable guarding of the R lateral hip, decreased R hip mobility, thoracic and lumbar hypomobility and decreased posture control. Pt R hand examination revealed positive varus stress test of the MCP with limited opening of the palm due to fascial and muscular restrictions. Pt will benefit from a full course of skilled PT intervention to address chronic back and leg pain, improve standing and walking abilities, and improve function of the R hand.     Therapy Problem List: (Impacting functional limitations):    Increased Pain, Decreased Strength, Decreased ROM, Decreased Functional Mobility, Decreased Endeavor with Home Exercise Program, Decreased Posture, Decreased Body Mechanics, and Decreased Activity Tolerance/Endurance*   Therapy Prognosis:   Excellent     Initial Assessment Complexity:   Moderate Complexity       PLAN   Effective Dates: 6/25/2025 TO Plan of

## 2025-06-30 ENCOUNTER — APPOINTMENT (OUTPATIENT)
Dept: PHYSICAL THERAPY | Age: 60
End: 2025-06-30
Payer: COMMERCIAL

## 2025-07-02 ENCOUNTER — HOSPITAL ENCOUNTER (OUTPATIENT)
Dept: PHYSICAL THERAPY | Age: 60
Setting detail: RECURRING SERIES
Discharge: HOME | End: 2025-07-05
Payer: COMMERCIAL

## 2025-07-02 PROCEDURE — 97110 THERAPEUTIC EXERCISES: CPT

## 2025-07-02 PROCEDURE — 97140 MANUAL THERAPY 1/> REGIONS: CPT

## 2025-07-02 ASSESSMENT — PAIN SCALES - GENERAL: PAINLEVEL_OUTOF10: 3

## 2025-07-02 NOTE — PROGRESS NOTES
Shana Avalos  : 1965  Primary: [employer] (Jody KNOTT[employer])  Secondary:  [facility] [place]  [address] [place] [address]  Phone: [phone]  Fax: [phone] Plan Frequency: 2x for 8 weeks    Plan of Care/Certification Expiration Date: 25      >PT Visit Info:  Plan Frequency: 2x for 8 weeks  Plan of Care/Certification Expiration Date: 25  Progress Note Counter: 2      Visit Count:  2    OUTPATIENT PHYSICAL THERAPY:OP NOTE TYPE: Treatment Note 2025       Episode  }Appt Desk             Treatment Diagnosis:    Other low back pain  Spinal stenosis, lumbar region with neurogenic claudication  Spondylolisthesis at L4-L5 level  Other chronic pain  Pain in joints of right hand  Medical/Referring Diagnosis:  Spondylolisthesis at L4-L5 level [M43.16]  Lumbar stenosis with neurogenic claudication [M48.062]  Spinal stenosis of lumbar region with neurogenic claudication [M48.062]  Referring Physician:  Driss Blanco MD MD Orders:  PT Eval and Treat   Date of Onset:  Onset Date: 25     Allergies:   Patient has no known allergies.  Restrictions/Precautions:  No data recordedNo data recorded   Interventions Planned (Treatment may consist of any combination of the following):    No data recorded   >Subjective Comments:   Shana reports she is doing ok, the finger is hurting really badly. She sees hand doctor next week. No cramping in the leg recently.   >Initial:     3/10>Post Session:       3/10  Medications Last Reviewed:  2025  Updated Objective Findings:    Limited 2nd digit finger flexion holds   R thenar eminence trigger points   R hip limited IR   Treatment   THERAPEUTIC EXERCISE: (40 minutes):    Exercises per grid below to improve mobility, strength, and balance.  Required moderate visual, verbal, and manual cues to promote proper body alignment, promote proper body posture, and promote proper body mechanics.  Progressed resistance,

## 2025-07-08 ENCOUNTER — EVALUATION (OUTPATIENT)
Age: 60
End: 2025-07-08
Payer: COMMERCIAL

## 2025-07-08 ENCOUNTER — OFFICE VISIT (OUTPATIENT)
Age: 60
End: 2025-07-08
Payer: COMMERCIAL

## 2025-07-08 DIAGNOSIS — M79.641 PAIN OF RIGHT HAND: ICD-10-CM

## 2025-07-08 DIAGNOSIS — M79.641 PAIN OF RIGHT HAND: Primary | ICD-10-CM

## 2025-07-08 DIAGNOSIS — M66.241 NONTRAUMATIC RUPTURE OF SAGITTAL BAND OF EXTENSOR TENDON OF RIGHT UPPER EXTREMITY: Primary | ICD-10-CM

## 2025-07-08 PROCEDURE — 99214 OFFICE O/P EST MOD 30 MIN: CPT | Performed by: NURSE PRACTITIONER

## 2025-07-08 PROCEDURE — L3913 HFO W/O JOINTS CF: HCPCS | Performed by: OCCUPATIONAL THERAPIST

## 2025-07-08 NOTE — PROGRESS NOTES
[facility] ORTHOPAEDICS  [address] [address]  Dept: [phone]   Occupational Therapy Orthosis Note     Referring MD: Jodi Lemos CNP  Date: 7/8/2025  Diagnosis:    Diagnosis Orders   1. Nontraumatic rupture of sagittal band of extensor tendon of right upper extremity        2. Pain of right hand           Therapy Precautions: Tendon precautions:  keep IF MPJ extended 6 weeks    Payor: Payor: [place] [employer] /  /  /  Billing pattern: Commercial- substantial/midpoint time each CPT  Total Timed Codes: 0 min, Total Treatment Time: 15 min  Modifier needed: No  Episode visit count:  1     PMH:   Affected Extremity: right    Date of Injury: has reinjured the finger 3 x over several months    Date of Surgery: NA    Mechanism of Injury: spontaneous after hitting the MPJ, then having chroninc inflammation at the MPJ    Wound/Pin/Incision: NA    ORTHOSIS ISSUED:   : HFO (hand finger orthosis), C/F (custom fabricated) - without joints, may include soft interface, straps, includes fitting and adjustment.     R relative motion IF extension    TREATMENT PROVIDED:   Patient instructed in purpose, care, and precaution of orthosis wearing and Patient instructed in wearing schedule    WEAR SCHEDULE:   At all times    CARE OF ORTHOSIS AND PRECAUTIONS REVIEWED:   The orthosis can be cleaned with soap and water, rubbing alcohol, or a mild cleanser  Keep away from any direct source of heat, it will melt and/or lose it's shape  Keep away from dogs and/or pets that will chew the orthosis  Should the orthosis result in increased pain, numbness/tingling, increased swelling, or overall worsening of condition, patient is to contact the office immediately during business hours     TREATMENT GOALS:   Patient to demonstrate independence with donning/doffing orthosis in one visit  Patient to verbalize understanding of inspecting skin to prevent pressure areas and allergic reaction due to orthosis  Patient to

## 2025-07-09 ENCOUNTER — HOSPITAL ENCOUNTER (OUTPATIENT)
Dept: PHYSICAL THERAPY | Age: 60
Setting detail: RECURRING SERIES
Discharge: HOME | End: 2025-07-12
Payer: COMMERCIAL

## 2025-07-09 PROCEDURE — 97110 THERAPEUTIC EXERCISES: CPT

## 2025-07-09 PROCEDURE — 97140 MANUAL THERAPY 1/> REGIONS: CPT

## 2025-07-09 ASSESSMENT — PAIN SCALES - GENERAL: PAINLEVEL_OUTOF10: 1

## 2025-07-09 NOTE — PROGRESS NOTES
Orthopaedic Hand Clinic Note    Name: Shana Avalos  Age: 60 y.o.  YOB: 1965  Gender: female  MRN: [id number]      Follow up visit:   1. Pain of right hand        HPI: Shana Avalos is a 60 y.o. female who comes in today for evaluation of a right index finger injury.  She has been treated for this finger for multiple issues.  She has right index finger MCP arthritis.  She has received 2 cortisone injections for that.  She had an injury where she hit the top of the MCP joint underneath the bed which did cause swelling and inflammation.  I last saw her Valentina 3, 2025.  She received a joint injection into the index finger MCP.  She reports excellent relief.  She says 2 weeks ago she was pulling out the plastic stem of a beach ball.  She said she felt and heard a pop on the top of the MCP joint of the right index finger.  She had significant pain and swelling.  She has been using yuliet straps.  She says her finger gets stuck if she closes her hand and tries to open the hand.  She did start on some steroids.    ROS/Meds/PSH/PMH/FH/SH: I personally reviewed the patients standard intake form.  Pertinents are discussed in the HPI    Physical Examination:  General: Awake and alert.  HEENT: Normocephalic, atraumatic  CV/Pulm: Breathing even and unlabored  Skin: No obvious rashes noted.  Lymphatic: No obvious evidence of lymphedema or lymphadenopathy    Musculoskeletal Examination:  Examination on the right upper extremity demonstrates cap refill < 5 seconds in all fingers  Patient has swelling over the dorsal aspect of the right index finger MCP joint.  When making a fist her extensor tendon slips.  She has to physically pull up the finger for the tendon to track well.  She is nontender to the joint.  She has no paresthesia.  She has good capillary refill.    Imaging / Electrodiagnostic Tests:     None    Assessment:       Plan:   We discussed the diagnosis and different treatment options. We

## 2025-07-09 NOTE — PROGRESS NOTES
Shana Avalos  : 1965  Primary: [employer] (Jody KNOTTBS)  Secondary:  [facility] [place]  [address] [place] [address]  Phone: [phone]  Fax: [phone] Plan Frequency: 2x for 8 weeks    Plan of Care/Certification Expiration Date: 25      >PT Visit Info:  Plan Frequency: 2x for 8 weeks  Plan of Care/Certification Expiration Date: 25  Progress Note Counter: 3      Visit Count:  3    OUTPATIENT PHYSICAL THERAPY:OP NOTE TYPE: Treatment Note 2025       Episode  }Appt Desk             Treatment Diagnosis:    Other low back pain  Spinal stenosis, lumbar region with neurogenic claudication  Spondylolisthesis at L4-L5 level  Other chronic pain  Pain in joints of right hand  Medical/Referring Diagnosis:  Spondylolisthesis at L4-L5 level [M43.16]  Lumbar stenosis with neurogenic claudication [M48.062]  Spinal stenosis of lumbar region with neurogenic claudication [M48.062]  Referring Physician:  Driss Blanco MD MD Orders:  PT Eval and Treat   Date of Onset:  Onset Date: 25     Allergies:   Patient has no known allergies.  Restrictions/Precautions:  No data recordedNo data recorded   Interventions Planned (Treatment may consist of any combination of the following):    No data recorded   >Subjective Comments:  Shana states she is wearing a hand brace that is really helping her finger. She is doing well with her back and hamstring pain.     >Initial:     1/10>Post Session:       1/10  Medications Last Reviewed:  2025  Updated Objective Findings:    Limited 2nd digit finger flexion holds   R thenar eminence trigger points   R hip limited IR   Treatment   THERAPEUTIC EXERCISE: (40 minutes):    Exercises per grid below to improve mobility, strength, and balance.  Required moderate visual, verbal, and manual cues to promote proper body alignment, promote proper body posture, and promote proper body mechanics.  Progressed resistance, range,

## 2025-07-11 ENCOUNTER — OFFICE VISIT (OUTPATIENT)
Age: 60
End: 2025-07-11

## 2025-07-11 ENCOUNTER — TREATMENT (OUTPATIENT)
Age: 60
End: 2025-07-11
Payer: COMMERCIAL

## 2025-07-11 DIAGNOSIS — M79.641 PAIN OF RIGHT HAND: Primary | ICD-10-CM

## 2025-07-11 DIAGNOSIS — M18.12 ARTHRITIS OF CARPOMETACARPAL (CMC) JOINT OF LEFT THUMB: ICD-10-CM

## 2025-07-11 DIAGNOSIS — M18.11 ARTHRITIS OF CARPOMETACARPAL (CMC) JOINT OF RIGHT THUMB: Primary | ICD-10-CM

## 2025-07-11 PROCEDURE — 97763 ORTHC/PROSTC MGMT SBSQ ENC: CPT | Performed by: OCCUPATIONAL THERAPIST

## 2025-07-11 RX ORDER — METHYLPREDNISOLONE ACETATE 40 MG/ML
40 INJECTION, SUSPENSION INTRA-ARTICULAR; INTRALESIONAL; INTRAMUSCULAR; SOFT TISSUE ONCE
Status: COMPLETED | OUTPATIENT
Start: 2025-07-11 | End: 2025-07-11

## 2025-07-11 RX ADMIN — METHYLPREDNISOLONE ACETATE 40 MG: 40 INJECTION, SUSPENSION INTRA-ARTICULAR; INTRALESIONAL; INTRAMUSCULAR; SOFT TISSUE at 12:11

## 2025-07-11 NOTE — PROGRESS NOTES
and allergic reaction due to orthosis  Patient to verbalize understanding of precautions and necessity of wearing orthosis as indicated by therapist    ALL TREATMENT GOALS MET AT TIME OF EVALUATION:   Yes    PLAN:   Follow up on PRN basis for orthosis adjustments   (CPT 63421- Orthotic Management and Training Subsequent)

## 2025-07-11 NOTE — PROGRESS NOTES
persistent chronic pain and that surgical reconstruction is the endpoint for patients whose symptoms are not properly alleviated with conservative measures but the vast majority of patients end up having surgery. After discussing all options in detail the patient elects to proceed with bilateral thumb CMC injections.  She has a therapy appointment today for modification of her yoke splint.  I will see her back in 3 months.    Procedure Note    The risk, benefits and alternatives of injection and no injection therapy were discussed. Risks including skin blanching, subcutaneous fat atrophy, and elevations in blood glucose levels were discussed.  The patient consented for an injection. Time out performed. The patient has been identified by name and birthdate. The injection site was identified, marked and prepped with a alcohol swab. The bilateral thumb CMC joints were injected with 1ml of 40mg/ml Depomedrol and 1ml of Lidocaine plain 1%. The injection site was then dressed with a bandaid. The patient tolerated the injection well. The patient was instructed to monitor their blood sugars if diabetic and call if any concerns. The patient was instructed to call the office if any adverse local effects occurred or any if any questions or concerns arise.     Patient voiced accordance and understanding of the information provided and the formulated plan. All questions were answered to the patient's satisfaction during the encounter.    Treatment at this time:Minor procedure: cortisone injection. I discussed the risk of skin blanching and hyperglycemia.  I discussed the symptoms of hyperglycemia such as confusion, lethargy, polyuria and polydipsia.  If any of these symptoms occur the patient is to present to an urgent care facility or primary care doctor for blood sugar evaluation.    FRANCES Queen - CNP  Orthopaedic Surgery  07/11/25  12:20 PM

## 2025-07-14 ENCOUNTER — HOSPITAL ENCOUNTER (OUTPATIENT)
Dept: PHYSICAL THERAPY | Age: 60
Setting detail: RECURRING SERIES
Discharge: HOME | End: 2025-07-17
Payer: COMMERCIAL

## 2025-07-14 PROCEDURE — 97110 THERAPEUTIC EXERCISES: CPT

## 2025-07-14 PROCEDURE — 97140 MANUAL THERAPY 1/> REGIONS: CPT

## 2025-07-14 ASSESSMENT — PAIN SCALES - GENERAL: PAINLEVEL_OUTOF10: 1

## 2025-07-14 NOTE — PROGRESS NOTES
Shana Avalos  : 1965  Primary: [employer] ([employer])  Secondary:  [facility] [place]  [address] [place] [address]  Phone: [phone]  Fax: [phone] Plan Frequency: 2x for 8 weeks    Plan of Care/Certification Expiration Date: 25      >PT Visit Info:  Plan Frequency: 2x for 8 weeks  Plan of Care/Certification Expiration Date: 25  Progress Note Counter: 4      Visit Count:  4    OUTPATIENT PHYSICAL THERAPY:OP NOTE TYPE: Treatment Note 2025       Episode  }Appt Desk             Treatment Diagnosis:    Other low back pain  Spinal stenosis, lumbar region with neurogenic claudication  Spondylolisthesis at L4-L5 level  Other chronic pain  Pain in joints of right hand  Medical/Referring Diagnosis:  Spondylolisthesis at L4-L5 level [M43.16]  Lumbar stenosis with neurogenic claudication [M48.062]  Spinal stenosis of lumbar region with neurogenic claudication [M48.062]  Referring Physician:  Driss Blanco MD MD Orders:  PT Eval and Treat   Date of Onset:  Onset Date: 25     Allergies:   Patient has no known allergies.  Restrictions/Precautions:  No data recordedNo data recorded   Interventions Planned (Treatment may consist of any combination of the following):    No data recorded   >Subjective Comments:  Shana reports she is doing pretty well, not having much pain in her back. Plan for 2 more f/u visits before d/c.      >Initial:     1/10>Post Session:       1/10  Medications Last Reviewed:  2025  Updated Objective Findings:    Improved tissue tone throughout lumbar spine   Treatment   THERAPEUTIC EXERCISE: (40 minutes):    Exercises per grid below to improve mobility, strength, and balance.  Required moderate visual, verbal, and manual cues to promote proper body alignment, promote proper body posture, and promote proper body mechanics.  Progressed resistance, range, and repetitions as indicated.  Date: 25  [employer] Desk

## 2025-07-16 ENCOUNTER — CLINICAL DOCUMENTATION (OUTPATIENT)
Dept: OBGYN CLINIC | Age: 60
End: 2025-07-16

## 2025-07-16 ENCOUNTER — APPOINTMENT (OUTPATIENT)
Dept: PHYSICAL THERAPY | Age: 60
End: 2025-07-16
Payer: COMMERCIAL

## 2025-07-21 ENCOUNTER — HOSPITAL ENCOUNTER (OUTPATIENT)
Dept: PHYSICAL THERAPY | Age: 60
Setting detail: RECURRING SERIES
Discharge: HOME | End: 2025-07-24
Payer: COMMERCIAL

## 2025-07-21 PROCEDURE — 97110 THERAPEUTIC EXERCISES: CPT

## 2025-07-21 PROCEDURE — 97140 MANUAL THERAPY 1/> REGIONS: CPT

## 2025-07-21 ASSESSMENT — PAIN SCALES - GENERAL: PAINLEVEL_OUTOF10: 4

## 2025-07-21 NOTE — PROGRESS NOTES
range, and repetitions as indicated.  Date: 07/21/25  LTR x2 min for hip IR   Ppt with hip abduction x5\" x12 reps   Bridges x15 reps   STS x15 reps   Calf and HS stretch at slant board 2x2 min   3 way hip to fatigue   Walking marches x2 lengths of bar   Lateral stepping x3 lengths of sanchez   Paloff press x15 reps B   Squat to stand posterior sling to fatigue B red TB   Posterior sling marches   Hip flexor stretch at wall x2 min B   Donkey kicks with hip flexion B at wall x8 B - fatigued       Deferred   Open books x15 B   Clam shells to fatigue B   Tendon gliding x10 reps R   Supine sciatic n glides x15 reps  B  Prabhjot tape applied and instructed       HEP: LTR for hip IR, ppt, finger extension and flexion  MANUAL THERAPY: (15 minutes):   Joint mobilization and Soft tissue mobilization was utilized and necessary because of the patient's restricted joint motion, painful spasm, and loss of articular motion.   Date: 07/21/25    R Adductor stm/mfr and rolling   R hp LAD mjm garde 3/4   R hip post glide mjm grade 3/4 with IR   Deferred   R thumb, hand and wrist stm/mfr   B glute stm/mfr   B SI And hip LAD Mjm grade 3/4   B side body gapping mjm grade 2/3   R MCP distraction and mobilization flex and extension mjm grade 2/3   R hamstring stm/mfr   L4/5 L5/S1 gapping mjm grade 3/4       Treatment/Session Summary:    >Treatment Assessment:  Shana did well today, plan to f/u next week and discuss d/c vs maintenance program. Pt requires further lumbopelvic stability and endurance to decrease chronic pain.     Communication/Consultation:  None today  Equipment provided today:  HEP provided  Recommendations/Intent for next treatment session: Next visit will focus on lumbar, hip, and SI mobility stability and strength.    >Total Treatment Billable Duration:  50 direct minutes   Time In: 1130  Time Out: 1220    TILA DEVLIN PT       Charge Capture  }Post Session Pain  PT Visit Info  [facility] Portal  MD Guidelines

## 2025-07-23 ENCOUNTER — APPOINTMENT (OUTPATIENT)
Dept: PHYSICAL THERAPY | Age: 60
End: 2025-07-23
Payer: COMMERCIAL

## 2025-07-29 ENCOUNTER — HOSPITAL ENCOUNTER (OUTPATIENT)
Dept: PHYSICAL THERAPY | Age: 60
Setting detail: RECURRING SERIES
Discharge: HOME | End: 2025-08-01
Payer: COMMERCIAL

## 2025-07-29 PROCEDURE — 97110 THERAPEUTIC EXERCISES: CPT

## 2025-07-29 PROCEDURE — 97140 MANUAL THERAPY 1/> REGIONS: CPT

## 2025-07-30 DIAGNOSIS — M19.042 DEGENERATIVE ARTHRITIS OF METACARPOPHALANGEAL JOINT OF INDEX FINGER OF LEFT HAND: ICD-10-CM

## 2025-07-30 DIAGNOSIS — M79.89 SWELLING OF RIGHT HAND: ICD-10-CM

## 2025-07-30 DIAGNOSIS — M79.641 PAIN OF RIGHT HAND: Primary | ICD-10-CM

## 2025-07-30 DIAGNOSIS — M19.90 INFLAMMATORY ARTHROPATHY: ICD-10-CM

## 2025-07-30 ASSESSMENT — PAIN SCALES - GENERAL: PAINLEVEL_OUTOF10: 2

## 2025-07-30 NOTE — PROGRESS NOTES
today  Equipment provided today:  HEP provided  Recommendations/Intent for next treatment session: Next visit will focus on lumbar, hip, and SI mobility stability and strength.    >Total Treatment Billable Duration:  55 direct minutes   Time In: 1035  Time Out: 1129    TILA DEVLIN, PT       Charge Capture  }Post Session Pain  PT Visit Info  [facility] Portal  MD Guidelines  Scanned Media  Benefits  MyChart    Future Appointments   Date Time Provider Department Center   10/13/2025 10:40 AM Jodi Lemos, APRN - CNP [facility]

## 2025-08-01 ENCOUNTER — HOSPITAL ENCOUNTER (OUTPATIENT)
Dept: MRI IMAGING | Age: 60
Discharge: HOME | End: 2025-08-01
Payer: COMMERCIAL

## 2025-08-01 DIAGNOSIS — M79.89 SWELLING OF RIGHT HAND: ICD-10-CM

## 2025-08-01 DIAGNOSIS — M19.90 INFLAMMATORY ARTHROPATHY: ICD-10-CM

## 2025-08-01 DIAGNOSIS — M19.042 DEGENERATIVE ARTHRITIS OF METACARPOPHALANGEAL JOINT OF INDEX FINGER OF LEFT HAND: ICD-10-CM

## 2025-08-01 DIAGNOSIS — M79.641 PAIN OF RIGHT HAND: ICD-10-CM

## 2025-08-01 PROCEDURE — 73218 MRI UPPER EXTREMITY W/O DYE: CPT

## 2025-08-06 ENCOUNTER — TELEPHONE (OUTPATIENT)
Age: 60
End: 2025-08-06

## 2025-08-06 ENCOUNTER — TELEPHONE (OUTPATIENT)
Dept: ORTHOPEDIC SURGERY | Age: 60
End: 2025-08-06

## 2025-08-07 ENCOUNTER — TELEPHONE (OUTPATIENT)
Age: 60
End: 2025-08-07

## 2025-09-02 DIAGNOSIS — M79.641 PAIN OF RIGHT HAND: ICD-10-CM

## 2025-09-02 DIAGNOSIS — M79.89 SWELLING OF RIGHT HAND: ICD-10-CM

## 2025-09-03 RX ORDER — MELOXICAM 15 MG/1
15 TABLET ORAL DAILY
Qty: 28 TABLET | Refills: 0 | Status: SHIPPED | OUTPATIENT
Start: 2025-09-03 | End: 2025-10-01

## (undated) DEVICE — GLOVE ORTHO 8   MSG9480

## (undated) DEVICE — GOWN,SIRUS,NONRNF,SETINSLV,XL,20/CS: Brand: MEDLINE

## (undated) DEVICE — CONTAINER,SPECIMEN,O.R.STRL,4.5OZ: Brand: MEDLINE

## (undated) DEVICE — GLOVE SURG SZ 8 L12IN FNGR THK79MIL GRN LTX FREE

## (undated) DEVICE — DRESSING PETRO W3XL8IN OIL EMUL N ADH GZ KNIT IMPREG CELOS

## (undated) DEVICE — FOOT & ANKLE SOFT DR WOMACK: Brand: MEDLINE INDUSTRIES, INC.

## (undated) DEVICE — STERILE PVP: Brand: MEDLINE INDUSTRIES, INC.

## (undated) DEVICE — SUTURE ETHLN SZ 3-0 L18IN NONABSORBABLE BLK PS-2 L19MM 3/8 1669H

## (undated) DEVICE — DYNAMIC COMPRESSION SYSTEM: Brand: EASYFUSE

## (undated) DEVICE — ZIMMER® STERILE DISPOSABLE TOURNIQUET CUFF WITH PLC, DUAL PORT, SINGLE BLADDER, 18 IN. (46 CM)

## (undated) DEVICE — BANDAGE COMPR L5YDXW2IN FOAM CO FLX

## (undated) DEVICE — SOLUTION IRRIG 1000ML 0.9% SOD CHL USP POUR PLAS BTL

## (undated) DEVICE — GLOVE ORANGE PI 7 1/2   MSG9075

## (undated) DEVICE — CLOTH PRE OP W9XL10.5IN 2% CHG FRAGRANCE RNS FREE READYPREP

## (undated) DEVICE — SPLINT THMB W4XL30IN FBRGLS PD PRECUT LTWT DURABLE FAST SET

## (undated) DEVICE — BANDAGE GZ W2XL75IN ST RAYON POLY CNFRM STRTCH LTWT

## (undated) DEVICE — GLOVE SURG SZ 85 L12IN FNGR ORTHO 126MIL CRM LTX FREE

## (undated) DEVICE — BANDAGE,ELASTIC,ESMARK,STERILE,4"X9',LF: Brand: MEDLINE

## (undated) DEVICE — PRECISION THIN (9.0 X 0.38 X 31.0MM)

## (undated) DEVICE — SUTURE MCRYL SZ 3-0 L27IN ABSRB UD L19MM PS-2 3/8 CIR PRIM Y427H

## (undated) DEVICE — DRAPE C ARM W54XL84IN MINI FOR OEC 6800